# Patient Record
Sex: FEMALE | Race: WHITE | NOT HISPANIC OR LATINO | Employment: UNEMPLOYED | URBAN - METROPOLITAN AREA
[De-identification: names, ages, dates, MRNs, and addresses within clinical notes are randomized per-mention and may not be internally consistent; named-entity substitution may affect disease eponyms.]

---

## 2018-12-12 ENCOUNTER — APPOINTMENT (OUTPATIENT)
Dept: GENERAL RADIOLOGY | Facility: HOSPITAL | Age: 83
End: 2018-12-12

## 2018-12-12 ENCOUNTER — HOSPITAL ENCOUNTER (INPATIENT)
Facility: HOSPITAL | Age: 83
LOS: 8 days | Discharge: HOSPICE | End: 2018-12-20
Attending: FAMILY MEDICINE | Admitting: INTERNAL MEDICINE

## 2018-12-12 ENCOUNTER — ANESTHESIA (OUTPATIENT)
Dept: PERIOP | Facility: HOSPITAL | Age: 83
End: 2018-12-12

## 2018-12-12 ENCOUNTER — ANESTHESIA EVENT (OUTPATIENT)
Dept: PERIOP | Facility: HOSPITAL | Age: 83
End: 2018-12-12
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
## 2018-12-12 DIAGNOSIS — S72.002A CLOSED FRACTURE OF LEFT HIP, INITIAL ENCOUNTER (HCC): Primary | ICD-10-CM

## 2018-12-12 DIAGNOSIS — Z87.81 S/P LEFT HIP FRACTURE: ICD-10-CM

## 2018-12-12 DIAGNOSIS — R26.89 DECREASED MOBILITY: ICD-10-CM

## 2018-12-12 PROBLEM — Z66 DNR (DO NOT RESUSCITATE): Status: ACTIVE | Noted: 2018-12-12

## 2018-12-12 LAB
ALBUMIN SERPL-MCNC: 3 G/DL (ref 3.5–5.2)
ALBUMIN/GLOB SERPL: 0.8 G/DL
ALP SERPL-CCNC: 67 U/L (ref 39–117)
ALT SERPL W P-5'-P-CCNC: 10 U/L (ref 1–33)
ANION GAP SERPL CALCULATED.3IONS-SCNC: 12.6 MMOL/L
AST SERPL-CCNC: 17 U/L (ref 1–32)
BASOPHILS # BLD AUTO: 0.02 10*3/MM3 (ref 0–0.2)
BASOPHILS NFR BLD AUTO: 0.2 % (ref 0–1.5)
BILIRUB SERPL-MCNC: 0.6 MG/DL (ref 0.1–1.2)
BUN BLD-MCNC: 7 MG/DL (ref 8–23)
BUN/CREAT SERPL: 11.3 (ref 7–25)
CALCIUM SPEC-SCNC: 8.8 MG/DL (ref 8.2–9.6)
CHLORIDE SERPL-SCNC: 94 MMOL/L (ref 98–107)
CO2 SERPL-SCNC: 25.4 MMOL/L (ref 22–29)
CREAT BLD-MCNC: 0.62 MG/DL (ref 0.57–1)
DEPRECATED RDW RBC AUTO: 51.8 FL (ref 37–54)
EOSINOPHIL # BLD AUTO: 0.13 10*3/MM3 (ref 0–0.7)
EOSINOPHIL NFR BLD AUTO: 1.2 % (ref 0.3–6.2)
ERYTHROCYTE [DISTWIDTH] IN BLOOD BY AUTOMATED COUNT: 15.3 % (ref 11.7–13)
GFR SERPL CREATININE-BSD FRML MDRD: 90 ML/MIN/1.73
GLOBULIN UR ELPH-MCNC: 3.8 GM/DL
GLUCOSE BLD-MCNC: 124 MG/DL (ref 65–99)
HCT VFR BLD AUTO: 41.5 % (ref 35.6–45.5)
HGB BLD-MCNC: 13.5 G/DL (ref 11.9–15.5)
HOLD SPECIMEN: NORMAL
HOLD SPECIMEN: NORMAL
IMM GRANULOCYTES # BLD: 0.04 10*3/MM3 (ref 0–0.03)
IMM GRANULOCYTES NFR BLD: 0.4 % (ref 0–0.5)
LYMPHOCYTES # BLD AUTO: 1.97 10*3/MM3 (ref 0.9–4.8)
LYMPHOCYTES NFR BLD AUTO: 17.5 % (ref 19.6–45.3)
MAGNESIUM SERPL-MCNC: 2.1 MG/DL (ref 1.7–2.3)
MCH RBC QN AUTO: 30.3 PG (ref 26.9–32)
MCHC RBC AUTO-ENTMCNC: 32.5 G/DL (ref 32.4–36.3)
MCV RBC AUTO: 93.3 FL (ref 80.5–98.2)
MONOCYTES # BLD AUTO: 0.75 10*3/MM3 (ref 0.2–1.2)
MONOCYTES NFR BLD AUTO: 6.7 % (ref 5–12)
NEUTROPHILS # BLD AUTO: 8.32 10*3/MM3 (ref 1.9–8.1)
NEUTROPHILS NFR BLD AUTO: 74 % (ref 42.7–76)
PLATELET # BLD AUTO: 392 10*3/MM3 (ref 140–500)
PMV BLD AUTO: 9.2 FL (ref 6–12)
POTASSIUM BLD-SCNC: 3.8 MMOL/L (ref 3.5–5.2)
PROT SERPL-MCNC: 6.8 G/DL (ref 6–8.5)
RBC # BLD AUTO: 4.45 10*6/MM3 (ref 3.9–5.2)
SODIUM BLD-SCNC: 132 MMOL/L (ref 136–145)
WBC NRBC COR # BLD: 11.23 10*3/MM3 (ref 4.5–10.7)
WHOLE BLOOD HOLD SPECIMEN: NORMAL
WHOLE BLOOD HOLD SPECIMEN: NORMAL

## 2018-12-12 PROCEDURE — 25010000002 HYDROMORPHONE PER 4 MG: Performed by: FAMILY MEDICINE

## 2018-12-12 PROCEDURE — 93005 ELECTROCARDIOGRAM TRACING: CPT | Performed by: INTERNAL MEDICINE

## 2018-12-12 PROCEDURE — 85025 COMPLETE CBC W/AUTO DIFF WBC: CPT | Performed by: INTERNAL MEDICINE

## 2018-12-12 PROCEDURE — 73502 X-RAY EXAM HIP UNI 2-3 VIEWS: CPT

## 2018-12-12 PROCEDURE — 25010000002 ONDANSETRON PER 1 MG: Performed by: FAMILY MEDICINE

## 2018-12-12 PROCEDURE — 36415 COLL VENOUS BLD VENIPUNCTURE: CPT | Performed by: INTERNAL MEDICINE

## 2018-12-12 PROCEDURE — 83735 ASSAY OF MAGNESIUM: CPT | Performed by: INTERNAL MEDICINE

## 2018-12-12 PROCEDURE — 93010 ELECTROCARDIOGRAM REPORT: CPT | Performed by: INTERNAL MEDICINE

## 2018-12-12 PROCEDURE — 71045 X-RAY EXAM CHEST 1 VIEW: CPT

## 2018-12-12 PROCEDURE — 25010000002 HYDROMORPHONE PER 4 MG: Performed by: INTERNAL MEDICINE

## 2018-12-12 PROCEDURE — 80053 COMPREHEN METABOLIC PANEL: CPT | Performed by: INTERNAL MEDICINE

## 2018-12-12 PROCEDURE — 99285 EMERGENCY DEPT VISIT HI MDM: CPT

## 2018-12-12 RX ORDER — ACETAMINOPHEN 325 MG/1
650 TABLET ORAL EVERY 4 HOURS PRN
Status: DISCONTINUED | OUTPATIENT
Start: 2018-12-12 | End: 2018-12-20 | Stop reason: HOSPADM

## 2018-12-12 RX ORDER — SODIUM CHLORIDE 9 MG/ML
50 INJECTION, SOLUTION INTRAVENOUS CONTINUOUS
Status: DISCONTINUED | OUTPATIENT
Start: 2018-12-12 | End: 2018-12-12

## 2018-12-12 RX ORDER — FENTANYL CITRATE 50 UG/ML
INJECTION, SOLUTION INTRAMUSCULAR; INTRAVENOUS
Status: DISCONTINUED
Start: 2018-12-12 | End: 2018-12-12 | Stop reason: WASHOUT

## 2018-12-12 RX ORDER — HYDROCODONE BITARTRATE AND ACETAMINOPHEN 5; 325 MG/1; MG/1
1 TABLET ORAL EVERY 4 HOURS PRN
Status: DISCONTINUED | OUTPATIENT
Start: 2018-12-12 | End: 2018-12-20 | Stop reason: HOSPADM

## 2018-12-12 RX ORDER — ONDANSETRON 2 MG/ML
4 INJECTION INTRAMUSCULAR; INTRAVENOUS ONCE
Status: COMPLETED | OUTPATIENT
Start: 2018-12-12 | End: 2018-12-12

## 2018-12-12 RX ORDER — ONDANSETRON 4 MG/1
4 TABLET, ORALLY DISINTEGRATING ORAL EVERY 6 HOURS PRN
Status: DISCONTINUED | OUTPATIENT
Start: 2018-12-12 | End: 2018-12-20 | Stop reason: HOSPADM

## 2018-12-12 RX ORDER — HYDROMORPHONE HYDROCHLORIDE 1 MG/ML
0.5 INJECTION, SOLUTION INTRAMUSCULAR; INTRAVENOUS; SUBCUTANEOUS
Status: DISCONTINUED | OUTPATIENT
Start: 2018-12-12 | End: 2018-12-20 | Stop reason: HOSPADM

## 2018-12-12 RX ORDER — ONDANSETRON 2 MG/ML
4 INJECTION INTRAMUSCULAR; INTRAVENOUS EVERY 6 HOURS PRN
Status: DISCONTINUED | OUTPATIENT
Start: 2018-12-12 | End: 2018-12-20 | Stop reason: HOSPADM

## 2018-12-12 RX ORDER — DOCUSATE SODIUM 100 MG/1
100 CAPSULE, LIQUID FILLED ORAL 2 TIMES DAILY
Status: DISCONTINUED | OUTPATIENT
Start: 2018-12-12 | End: 2018-12-15

## 2018-12-12 RX ORDER — CALCIUM CARBONATE 200(500)MG
2 TABLET,CHEWABLE ORAL 2 TIMES DAILY PRN
Status: DISCONTINUED | OUTPATIENT
Start: 2018-12-12 | End: 2018-12-20 | Stop reason: HOSPADM

## 2018-12-12 RX ORDER — SODIUM CHLORIDE 0.9 % (FLUSH) 0.9 %
3 SYRINGE (ML) INJECTION EVERY 12 HOURS SCHEDULED
Status: DISCONTINUED | OUTPATIENT
Start: 2018-12-12 | End: 2018-12-20 | Stop reason: HOSPADM

## 2018-12-12 RX ORDER — SODIUM CHLORIDE 0.9 % (FLUSH) 0.9 %
3-10 SYRINGE (ML) INJECTION AS NEEDED
Status: DISCONTINUED | OUTPATIENT
Start: 2018-12-12 | End: 2018-12-20 | Stop reason: HOSPADM

## 2018-12-12 RX ORDER — SODIUM CHLORIDE 9 MG/ML
125 INJECTION, SOLUTION INTRAVENOUS CONTINUOUS
Status: DISCONTINUED | OUTPATIENT
Start: 2018-12-12 | End: 2018-12-12

## 2018-12-12 RX ORDER — HYDROMORPHONE HYDROCHLORIDE 1 MG/ML
0.5 INJECTION, SOLUTION INTRAMUSCULAR; INTRAVENOUS; SUBCUTANEOUS ONCE
Status: COMPLETED | OUTPATIENT
Start: 2018-12-12 | End: 2018-12-12

## 2018-12-12 RX ORDER — NALOXONE HCL 0.4 MG/ML
0.4 VIAL (ML) INJECTION
Status: DISCONTINUED | OUTPATIENT
Start: 2018-12-12 | End: 2018-12-20 | Stop reason: HOSPADM

## 2018-12-12 RX ORDER — SODIUM CHLORIDE 9 MG/ML
50 INJECTION, SOLUTION INTRAVENOUS CONTINUOUS
Status: DISCONTINUED | OUTPATIENT
Start: 2018-12-12 | End: 2018-12-13

## 2018-12-12 RX ORDER — ONDANSETRON 4 MG/1
4 TABLET, FILM COATED ORAL EVERY 6 HOURS PRN
Status: DISCONTINUED | OUTPATIENT
Start: 2018-12-12 | End: 2018-12-20 | Stop reason: HOSPADM

## 2018-12-12 RX ADMIN — SODIUM CHLORIDE, PRESERVATIVE FREE 3 ML: 5 INJECTION INTRAVENOUS at 20:36

## 2018-12-12 RX ADMIN — HYDROMORPHONE HYDROCHLORIDE 0.5 MG: 1 INJECTION, SOLUTION INTRAMUSCULAR; INTRAVENOUS; SUBCUTANEOUS at 11:46

## 2018-12-12 RX ADMIN — ONDANSETRON 4 MG: 2 INJECTION INTRAMUSCULAR; INTRAVENOUS at 11:45

## 2018-12-12 RX ADMIN — HYDROCODONE BITARTRATE AND ACETAMINOPHEN 1 TABLET: 5; 325 TABLET ORAL at 22:17

## 2018-12-12 RX ADMIN — DOCUSATE SODIUM 100 MG: 100 CAPSULE, LIQUID FILLED ORAL at 20:36

## 2018-12-12 RX ADMIN — SODIUM CHLORIDE 50 ML/HR: 9 INJECTION, SOLUTION INTRAVENOUS at 18:33

## 2018-12-12 RX ADMIN — HYDROCODONE BITARTRATE AND ACETAMINOPHEN 1 TABLET: 5; 325 TABLET ORAL at 18:32

## 2018-12-12 RX ADMIN — SODIUM CHLORIDE 250 ML: 9 INJECTION, SOLUTION INTRAVENOUS at 13:26

## 2018-12-12 RX ADMIN — HYDROMORPHONE HYDROCHLORIDE 0.5 MG: 1 INJECTION, SOLUTION INTRAMUSCULAR; INTRAVENOUS; SUBCUTANEOUS at 15:26

## 2018-12-12 NOTE — H&P
Patient Name:  Araceli Mitchell  YOB: 1926  MRN:  [id number]  Admit Date:  12/12/2018  Patient Care Team:  Provider, No Known as PCP - General  Provider, No Known as PCP - Family Medicine  Elisabet Guerrero as PCP - Claims Attributed      Chief Complaint   Patient presents with   • Hip Injury     left, shortened and rotated     Subjective   Ms. Mitchell is a 89 y.o. former smoker with a history of CAD with ICM as well as afib intolerant of anticoagulation on DAPT that presents to [facility] complaining of left hip pain following a mechanical fall. She was found to have a left hip fracture.    She has a history of ICM with last known LVEF of 25% in September 2016.  At the time she had expressed that she did not want further workup including cardiac catheterization and elected for medical management.  She denies having followed up with a cardiologist since.  She denies chest pain, dyspnea on exertion, orthopnea, and peripheral edema. She walks from room to room in her house but does not do much else.       History of Present Illness    Past Medical History:   Diagnosis Date   • CHF (congestive heart failure) (CMS/HCC)    • Hyperlipidemia    • Hypertension      Past Surgical History:   Procedure Laterality Date   • CARDIAC SURGERY      stent placement     Family History   Problem Relation Age of Onset   • Heart attack Mother    • Heart attack Father      Social History     Tobacco Use   • Smoking status: Former Smoker   • Smokeless tobacco: Never Used   • Tobacco comment: 50 yrs as of 2016   Substance Use Topics   • Alcohol use: No   • Drug use: No     Medications Prior to Admission   Medication Sig Dispense Refill Last Dose   • aspirin 325 MG tablet Take 1 tablet by mouth daily. (Patient taking differently: Take 81 mg by mouth Daily.)   Patient Taking Differently at Unknown time   • furosemide (LASIX) 40 MG tablet Take 0.5 tablets by mouth every morning.   12/11/2018 at Unknown time   •  metoprolol succinate XL (TOPROL-XL) 25 MG 24 hr tablet Take 1 tablet by mouth daily.   12/11/2018 at Unknown time   • acetaminophen (TYLENOL) 325 MG tablet Take 2 tablets by mouth every 6 (six) hours as needed for mild pain (1-3).  0 More than a month at Unknown time   • clopidogrel (PLAVIX) 75 MG tablet Take 1 tablet by mouth daily. 30 tablet 0 More than a month at Unknown time   • lansoprazole (PREVACID) 30 MG capsule Take 30 mg by mouth 2 (two) times a day.   More than a month at Unknown time   • melatonin 3 MG tablet Take 1 tablet by mouth at night as needed for sleep.   More than a month at Unknown time   • pravastatin (PRAVACHOL) 80 MG tablet Take 1 tablet by mouth every night. 30 tablet 0 More than a month at Unknown time   • ranolazine (RANEXA) 1000 MG 12 hr tablet Take 1 tablet by mouth every 12 (twelve) hours.   Unknown at Unknown time   • rOPINIRole (REQUIP) 0.25 MG tablet Take 3 tablets by mouth every night. Take 1 hour before bedtime.   Unknown at Unknown time     Allergies:    Allergies   Allergen Reactions   • Clopidogrel Bisulfate Other (See Comments)     dizzy   • Lisinopril Other (See Comments)     Cough       Review of Systems   Constitutional: Negative for chills, fatigue and fever.   HENT: Negative for congestion, nosebleeds, sore throat and trouble swallowing.    Eyes: Negative for redness and visual disturbance.   Respiratory: Negative for cough and choking.    Cardiovascular: Negative for chest pain, palpitations and leg swelling.   Gastrointestinal: Negative for abdominal pain, blood in stool, constipation, diarrhea, nausea and vomiting.   Endocrine: Negative for cold intolerance and heat intolerance.   Genitourinary: Negative for difficulty urinating, dysuria and hematuria.   Musculoskeletal: Negative for arthralgias and myalgias.   Skin: Negative for pallor and rash.   Neurological: Negative for dizziness, light-headedness and headaches.   Hematological: Negative for adenopathy. Does not  bruise/bleed easily.   Psychiatric/Behavioral: Negative for confusion and decreased concentration.        Objective    Vital Signs  Temp:  [97 °F (36.1 °C)-97.3 °F (36.3 °C)] 97 °F (36.1 °C)  Heart Rate:  [68-72] 72  Resp:  [15-16] 16  BP: (112-154)/(67-82) 136/67  SpO2:  [94 %-98 %] 95 %  on   ;   Device (Oxygen Therapy): room air  Body mass index is 17.57 kg/m².    Physical Exam   Constitutional: She is oriented to person, place, and time. No distress.   HENT:   Head: Normocephalic and atraumatic.   Mouth/Throat: Oropharynx is clear and moist.   Eyes: Conjunctivae and EOM are normal. Pupils are equal, round, and reactive to light.   Neck: Normal range of motion. Neck supple. No JVD present.   Cardiovascular: Normal rate, regular rhythm and intact distal pulses.   Pulmonary/Chest: Effort normal and breath sounds normal. She has no rales.   Abdominal: Soft. Bowel sounds are normal.   Musculoskeletal: She exhibits no edema or tenderness.   Neurological: She is alert and oriented to person, place, and time.   Skin: Skin is warm and dry. She is not diaphoretic.   Psychiatric: She has a normal mood and affect. Her behavior is normal.   Nursing note and vitals reviewed.      Results Review:  I reviewed the patient's new clinical results.  I reviewed the patient's new imaging results and agree with the interpretation.  I reviewed the patient's other test results and agree with the interpretation  Discussed with ED provider.      Lab Results (last 24 hours)     Procedure Component Value Units Date/Time    CBC & Differential [[id number]] Collected:  12/12/18 1113    Specimen:  Blood Updated:  12/12/18 1446    Narrative:       The following orders were created for panel order CBC & Differential.  Procedure                               Abnormality         Status                     ---------                               -----------         ------                     CBC Auto Differential[[id number]]        Abnormal             Final result                 Please view results for these tests on the individual orders.    CBC Auto Differential [[id number]]  (Abnormal) Collected:  12/12/18 1113    Specimen:  Blood Updated:  12/12/18 1446     WBC 11.23 10*3/mm3      RBC 4.45 10*6/mm3      Hemoglobin 13.5 g/dL      Hematocrit 41.5 %      MCV 93.3 fL      MCH 30.3 pg      MCHC 32.5 g/dL      RDW 15.3 %      RDW-SD 51.8 fl      MPV 9.2 fL      Platelets 392 10*3/mm3      Neutrophil % 74.0 %      Lymphocyte % 17.5 %      Monocyte % 6.7 %      Eosinophil % 1.2 %      Basophil % 0.2 %      Immature Grans % 0.4 %      Neutrophils, Absolute 8.32 10*3/mm3      Lymphocytes, Absolute 1.97 10*3/mm3      Monocytes, Absolute 0.75 10*3/mm3      Eosinophils, Absolute 0.13 10*3/mm3      Basophils, Absolute 0.02 10*3/mm3      Immature Grans, Absolute 0.04 10*3/mm3           Imaging Results (last 24 hours)     Procedure Component Value Units Date/Time    XR Hip With or Without Pelvis 2 - 3 View Left [[id number]] Collected:  12/12/18 1311     Updated:  12/12/18 1333    Narrative:       PELVIS/LEFT HIP, AP CHEST     HISTORY: 89-year-old female with multiple falls this morning. Left hip  pain. History of hypertension.     COMPARISON: PA and lateral chest 09/02/2016, AP chest 08/30/2016.     FINDINGS:  AP PELVIS AND AP AND FROG LATERAL LEFT HIP: There is an acute left  intertrochanteric fracture that is associated with impaction and varus  angulation. There is potential extension to the basocervical medial left  femoral neck. The bones are osteopenic.     AP SUPINE CHEST: Large hiatal or paraesophageal hernia is present with  extension of colon and stomach overlapping the left lower thorax. Hiatal  hernia is increased in size compared to the previous exam. This obscures  evaluation of the left lung base and the heart. Heart size appears  enlarged. There is chronic interstitial disease. Dense atherosclerotic  calcifications are present. There is a scoliotic curvature of  the  thoracolumbar spine and multiple compression deformities are present  throughout the thoracic and lumbar spine. Diffuse osteopenia is present.       Impression:       1. Left intertrochanteric fracture with impaction and varus angulation  and potential extension to the basicervical femoral neck.  2. Advanced osteopenia. Multiple compression deformities in the  thoracolumbar spine.  3. Large hiatal or paraesophageal hernia with extension of stomach and  colonic loops overlapping the left lower thorax obscuring evaluation of  the left lung base. There is no convincing evidence for active disease  in the chest.     This report was finalized on 12/12/2018 1:30 PM by Dr. Sheng Hubbard M.D.       XR Chest 1 View [[id number]] Collected:  12/12/18 1311     Updated:  12/12/18 1333    Narrative:       PELVIS/LEFT HIP, AP CHEST     HISTORY: 89-year-old female with multiple falls this morning. Left hip  pain. History of hypertension.     COMPARISON: PA and lateral chest 09/02/2016, AP chest 08/30/2016.     FINDINGS:  AP PELVIS AND AP AND FROG LATERAL LEFT HIP: There is an acute left  intertrochanteric fracture that is associated with impaction and varus  angulation. There is potential extension to the basocervical medial left  femoral neck. The bones are osteopenic.     AP SUPINE CHEST: Large hiatal or paraesophageal hernia is present with  extension of colon and stomach overlapping the left lower thorax. Hiatal  hernia is increased in size compared to the previous exam. This obscures  evaluation of the left lung base and the heart. Heart size appears  enlarged. There is chronic interstitial disease. Dense atherosclerotic  calcifications are present. There is a scoliotic curvature of the  thoracolumbar spine and multiple compression deformities are present  throughout the thoracic and lumbar spine. Diffuse osteopenia is present.       Impression:       1. Left intertrochanteric fracture with impaction and varus  angulation  and potential extension to the basicervical femoral neck.  2. Advanced osteopenia. Multiple compression deformities in the  thoracolumbar spine.  3. Large hiatal or paraesophageal hernia with extension of stomach and  colonic loops overlapping the left lower thorax obscuring evaluation of  the left lung base. There is no convincing evidence for active disease  in the chest.     This report was finalized on 12/12/2018 1:30 PM by Dr. Sheng Hubbard M.D.             ECG 12 Lead    (Results Pending)     Assessment/Plan   Active Hospital Problems    Diagnosis Date Noted   • **Closed left hip fracture, initial encounter (CMS/[facility]) [S72.002A] 12/12/2018   • Closed fracture of left hip (CMS/[facility]) [S72.002A] 12/12/2018   • DNR (do not resuscitate) [Z66] 12/12/2018   • Chronic a-fib (CMS/[facility]) [I48.2] 09/04/2016   • Ischemic cardiomyopathy [I25.5] 09/04/2016   • CAD (coronary artery disease) [I25.10] 09/04/2016      Resolved Hospital Problems   No resolved problems to display.   Left Hip fracture  - from mechanical fall  - place on bed rest, pain control  - orthopedic surgery consulted, planning OR today  - she is at elevated operative cardiac risk given her CAD and CHF (currently asymptomatic)-RCRI is 2, representing 6.6% chance of major cardiac event which realistically is probably a little higher than that- I did discuss this with the patient as well as the risk of not having/delaying surgery for her hip.  She states that she would not want invasive cardiac intervention such as catheterization, and review of records shows she did elect to forego this when she was hospitalized here 2 years ago.  She understands these risks including fatal cardiac arrest and wishes to proceed.  I will check an ecg to ensure no active issues prior to surgery.     CAD/ICM  - last known LVEF 25% in 9/2016  - currently no cardiac symptoms and no signs of CHF  - will monitor closely on telemetry following surgery    DVT Prophylaxis  -  SCDs    Code Status  - I discussed with the patient and her son and she is DNR.    D/w Dr. Alonso    I discussed the patients findings and my recommendations with patient, family and nursing staff.      John Quiroz MD  [facility]  12/12/18  4:36 PM

## 2018-12-12 NOTE — CONSULTS
ORTHOPAEDIC SURGERY CONSULT NOTE  HPI:  Patient is a 89 y.o. Not  or  female who presents with hip pain after a fall from standing.  They presented to the ER for further workup where a left Intertrochanteric Hip Fracture was found. I was consulted for further management.  She does have an extensive cardiac history.      Past Medical History:   Diagnosis Date   • CHF (congestive heart failure) (CMS/[facility])    • Hyperlipidemia    • Hypertension      Past Surgical History:   Procedure Laterality Date   • CARDIAC SURGERY      stent placement     Prior to Admission medications    Medication Sig Start Date End Date Taking? Authorizing Provider   aspirin 325 MG tablet Take 1 tablet by mouth daily.  Patient taking differently: Take 81 mg by mouth Daily. 9/10/16  Yes Marietta Carrasco MD   furosemide (LASIX) 40 MG tablet Take 0.5 tablets by mouth every morning. 9/10/16  Yes Marietta Carrasco MD   metoprolol succinate XL (TOPROL-XL) 25 MG 24 hr tablet Take 1 tablet by mouth daily. 9/10/16  Yes Marietta Carrasco MD   acetaminophen (TYLENOL) 325 MG tablet Take 2 tablets by mouth every 6 (six) hours as needed for mild pain (1-3). 9/10/16   Marietta Carrasco MD   clopidogrel (PLAVIX) 75 MG tablet Take 1 tablet by mouth daily. 9/10/16   Marietta Carrasco MD   lansoprazole (PREVACID) 30 MG capsule Take 30 mg by mouth 2 (two) times a day. 9/11/15   Rc Moralez MD   melatonin 3 MG tablet Take 1 tablet by mouth at night as needed for sleep. 9/10/16   Marietta Carrasco MD   pravastatin (PRAVACHOL) 80 MG tablet Take 1 tablet by mouth every night. 9/10/16   Marietta Carrasco MD   ranolazine (RANEXA) 1000 MG 12 hr tablet Take 1 tablet by mouth every 12 (twelve) hours. 9/10/16   Marietta Carrasco MD   rOPINIRole (REQUIP) 0.25 MG tablet Take 3 tablets by mouth every night. Take 1 hour before bedtime. 9/10/16   Marietta Carrasco MD     Allergies   Allergen Reactions   • Clopidogrel Bisulfate Other (See Comments)     dizzy   •  "Lisinopril Other (See Comments)     Cough       There is no immunization history on file for this patient.  Social History     Tobacco Use   • Smoking status: Former Smoker   • Smokeless tobacco: Never Used   • Tobacco comment: 50 yrs as of 2016   Substance Use Topics   • Alcohol use: No      Social History     Substance and Sexual Activity   Drug Use No     REVIEW OF SYSTEMS:  Head: negative for headache  Respiratory: negative for shortness of breath.   Cardiovascular: negative for chest pain.   Gastrointestinal: negative abdominal pain.   Neurological: negative for LOC  Psychiatric/Behavioral: negative for memory loss.   All other systems reviewed and are negative  VITALS: /67 (BP Location: Left arm, Patient Position: Lying)   Pulse 72   Temp 97 °F (36.1 °C) (Oral)   Resp 16   Ht 152.4 cm (60\")   Wt 40.8 kg (89 lb 15.2 oz)   SpO2 95%   BMI 17.57 kg/m²  Body mass index is 17.57 kg/m².  EXAM:   CONSTITUTIONAL: A&Ox3, No acute distress  LUNGS: Equal chest rise, no shortness of air  CARDIOVASCULAR: palpable peripheral pulses  SKIN: no skin lesions in the area examined  LYMPH: no lymphadenopathy in the area examined  EXTREMITY: Left Lower Extremity   Tenderness to Palpation: Tenderness to palpation at the hip   Pulses:  Palpable DP/PT pulses   Sensation: Sensation intact to light touch to saphenous/sural/deep peroneal/superficial peroneal/tibial nerves   Motor: 5 out of 5 EHL/FHL/TA/GS motor complexes   Range of Motion: Range of motion of hip deferred secondary to pain.  Positive pain with passive leg roll    DATA REVIEW:   Xr Chest 1 View    Result Date: 12/12/2018  1. Left intertrochanteric fracture with impaction and varus angulation and potential extension to the basicervical femoral neck. 2. Advanced osteopenia. Multiple compression deformities in the thoracolumbar spine. 3. Large hiatal or paraesophageal hernia with extension of stomach and colonic loops overlapping the left lower thorax obscuring " evaluation of the left lung base. There is no convincing evidence for active disease in the chest.  This report was finalized on 12/12/2018 1:30 PM by Dr. Sheng Hubbard M.D.      Xr Hip With Or Without Pelvis 2 - 3 View Left    Result Date: 12/12/2018  1. Left intertrochanteric fracture with impaction and varus angulation and potential extension to the basicervical femoral neck. 2. Advanced osteopenia. Multiple compression deformities in the thoracolumbar spine. 3. Large hiatal or paraesophageal hernia with extension of stomach and colonic loops overlapping the left lower thorax obscuring evaluation of the left lung base. There is no convincing evidence for active disease in the chest.  This report was finalized on 12/12/2018 1:30 PM by Dr. Sheng Hubbard M.D.      Labs:   Results for the past 24 hours:   Recent Results (from the past 24 hour(s))   Light Blue Top    Collection Time: 12/12/18 11:13 AM   Result Value Ref Range    Extra Tube hold for add-on    Green Top (Gel)    Collection Time: 12/12/18 11:13 AM   Result Value Ref Range    Extra Tube Hold for add-ons.    Lavender Top    Collection Time: 12/12/18 11:13 AM   Result Value Ref Range    Extra Tube hold for add-on    Gold Top - SST    Collection Time: 12/12/18 11:13 AM   Result Value Ref Range    Extra Tube Hold for add-ons.    CBC Auto Differential    Collection Time: 12/12/18 11:13 AM   Result Value Ref Range    WBC 11.23 (H) 4.50 - 10.70 10*3/mm3    RBC 4.45 3.90 - 5.20 10*6/mm3    Hemoglobin 13.5 11.9 - 15.5 g/dL    Hematocrit 41.5 35.6 - 45.5 %    MCV 93.3 80.5 - 98.2 fL    MCH 30.3 26.9 - 32.0 pg    MCHC 32.5 32.4 - 36.3 g/dL    RDW 15.3 (H) 11.7 - 13.0 %    RDW-SD 51.8 37.0 - 54.0 fl    MPV 9.2 6.0 - 12.0 fL    Platelets 392 140 - 500 10*3/mm3    Neutrophil % 74.0 42.7 - 76.0 %    Lymphocyte % 17.5 (L) 19.6 - 45.3 %    Monocyte % 6.7 5.0 - 12.0 %    Eosinophil % 1.2 0.3 - 6.2 %    Basophil % 0.2 0.0 - 1.5 %    Immature Grans % 0.4 0.0 - 0.5 %     Neutrophils, Absolute 8.32 (H) 1.90 - 8.10 10*3/mm3    Lymphocytes, Absolute 1.97 0.90 - 4.80 10*3/mm3    Monocytes, Absolute 0.75 0.20 - 1.20 10*3/mm3    Eosinophils, Absolute 0.13 0.00 - 0.70 10*3/mm3    Basophils, Absolute 0.02 0.00 - 0.20 10*3/mm3    Immature Grans, Absolute 0.04 (H) 0.00 - 0.03 10*3/mm3   Comprehensive Metabolic Panel    Collection Time: 12/12/18  4:30 PM   Result Value Ref Range    Glucose 124 (H) 65 - 99 mg/dL    BUN 7 (L) 8 - 23 mg/dL    Creatinine 0.62 0.57 - 1.00 mg/dL    Sodium 132 (L) 136 - 145 mmol/L    Potassium 3.8 3.5 - 5.2 mmol/L    Chloride 94 (L) 98 - 107 mmol/L    CO2 25.4 22.0 - 29.0 mmol/L    Calcium 8.8 8.2 - 9.6 mg/dL    Total Protein 6.8 6.0 - 8.5 g/dL    Albumin 3.00 (L) 3.50 - 5.20 g/dL    ALT (SGPT) 10 1 - 33 U/L    AST (SGOT) 17 1 - 32 U/L    Alkaline Phosphatase 67 39 - 117 U/L    Total Bilirubin 0.6 0.1 - 1.2 mg/dL    eGFR Non African Amer 90 >60 mL/min/1.73    Globulin 3.8 gm/dL    A/G Ratio 0.8 g/dL    BUN/Creatinine Ratio 11.3 7.0 - 25.0    Anion Gap 12.6 mmol/L             IMPRESSION:  Patient is a 89 y.o. Not  or  female with left Intertrochanteric Hip Fracture  PLAN:   - Admited to: John Quiroz MD  - Diet: NPO after midnight, Regular for Now  - Weight Bearing:Left Lower Extremity Non Weight Bearing  - Labs: None additional needed  - Imaging: None additional needed  - Surgery: Intramedullary nailing for intertrochanteric hip fracture  - Consent: The risks and benefits of operative versus nonoperative treatment were discussed.  The patient elected to undergo operative treatment of their injury.  The risks discussed included but were not limited to blood clots, MI, stroke, other medical complications, infection, damage to neurovascular structures,, malunion, nonunion,, hardware prominence,, loss of range of motion, stiffness,, infection, need for further procedures, and and risk of anesthesia..  No guarantees were made   - RIVER castillo: I had a  discussion today with the hospitalist about her A. fib.  She has an extensive cardiac history, but she wishes to avoid any invasive procedures.  He wanted to check a EKG before proceeding with surgery.  The EKG showed no new cardiac disease, and so he cleared her for surgery this evening.  She is obviously at high risk because of her heart disease.  - Disposition: I came to the hospital tonight with the intention of performing the surgery for this patient.  She was nothing by mouth, and cleared by the medicine team prior to the 5:30 scheduled start time.  However, when I showed up in preop before her scheduled surgery time, she was not there.  When I discussed with the  Y the patient was in the preoperative area, they told me that the surgery had been delayed in might not be for an hour, 2 hours, or maybe even more.  They couldn't give me an explicit time when surgery could proceed tonight.  I don't feel it is fair to the patient to make them wait an unknown amount of time and then proceed with surgery late tonight with the surgical team that would necessarily be familiar with orthopedic equipment.  I will therefore delay her surgery until tomorrow.  I was told that there is an opening at 1 PM tomorrow afternoon to perform the surgery.  I will clear my schedule tomorrow for a 1 PM surgery time.  However, if it is further delayed end of the late afternoon, I will have to perform the surgery on Friday.  I made the patient aware that surgery is canceled tonight because of the OR and not because of the surgical team.  The patient understands unfortunately she cannot be given prompt care due to OR delays.  Hopefully I will be old to take care of this patient tomorrow.    Suhas Alonso II, MD  Orthopaedic Surgery  [facility]

## 2018-12-12 NOTE — ED TRIAGE NOTES
Patient presents to ER via ems from home after a mechanical fall.  Patient LLE is shortened and rotated.

## 2018-12-12 NOTE — ED PROVIDER NOTES
EMERGENCY DEPARTMENT ENCOUNTER    CHIEF COMPLAINT  Chief Complaint: left hip pain  History given by: patient  History limited by: nothing  Room Number: 12/12  PMD: Provider, No Known      HPI:  Pt is a 89 y.o. female who presents complaining of left hip pain that began PTA s/p a mechanical fall while at home. Pt reports that she lost her balance, fell, and landed on her left hip. Pt denies hitting her head, LOC, or any other sustaining injuries. Pt has a hx of CHF, hypertension, and hyperlipidemia. Pt currently takes 75 mg of Plavix and a 325 mg ASA daily. There are no other complaints at this time.    Duration: PTA  Onset: sudden  Timing: constant  Location: left hip  Radiation: none specified  Quality: pain  Intensity/Severity: moderate  Progression: not specified  Associated Symptoms: none specified  Aggravating Factors: mechanical fall  Alleviating Factors: none specified  Previous Episodes: none specified  Treatment before arrival: none specifid    PAST MEDICAL HISTORY  Active Ambulatory Problems     Diagnosis Date Noted   • NSTEMI (non-ST elevated myocardial infarction) (CMS/[facility]) 09/04/2016   • CAD (coronary artery disease) 09/04/2016   • Ischemic cardiomyopathy 09/04/2016   • Metabolic encephalopathy 09/04/2016   • Chronic a-fib (CMS/[facility]) 09/04/2016   • Hyponatremia 09/04/2016   • Hyperlipidemia 09/04/2016   • Pulmonary HTN (CMS/[facility]) 09/04/2016   • GERD (gastroesophageal reflux disease) 09/04/2016   • RLS (restless legs syndrome) 09/04/2016   • Pulmonary fibrosis (CMS/[facility]) 09/04/2016     Resolved Ambulatory Problems     Diagnosis Date Noted   • No Resolved Ambulatory Problems     Past Medical History:   Diagnosis Date   • CHF (congestive heart failure) (CMS/[facility])    • Hyperlipidemia    • Hypertension        PAST SURGICAL HISTORY  Past Surgical History:   Procedure Laterality Date   • CARDIAC SURGERY      stent placement       FAMILY HISTORY  Family History   Problem Relation Age of Onset   • Heart attack  Mother    • Heart attack Father        SOCIAL HISTORY  Social History     Socioeconomic History   • Marital status: Single     Spouse name: Not on file   • Number of children: Not on file   • Years of education: Not on file   • Highest education level: Not on file   Social Needs   • Financial resource strain: Not on file   • Food insecurity - worry: Not on file   • Food insecurity - inability: Not on file   • Transportation needs - medical: Not on file   • Transportation needs - non-medical: Not on file   Occupational History   • Not on file   Tobacco Use   • Smoking status: Former Smoker   • Smokeless tobacco: Never Used   • Tobacco comment: 50 yrs as of 2016   Substance and Sexual Activity   • Alcohol use: No   • Drug use: No   • Sexual activity: Defer   Other Topics Concern   • Not on file   Social History Narrative   • Not on file       ALLERGIES  Clopidogrel bisulfate and Lisinopril    REVIEW OF SYSTEMS  Review of Systems   Constitutional: Negative for fever.   HENT: Negative for sore throat.    Eyes: Negative.    Respiratory: Negative for cough and shortness of breath.    Cardiovascular: Negative for chest pain.   Gastrointestinal: Negative for abdominal pain, diarrhea and vomiting.   Genitourinary: Negative for dysuria.   Musculoskeletal: Positive for arthralgias (left hip). Negative for neck pain.   Skin: Negative for rash.   Allergic/Immunologic: Negative.    Neurological: Negative for syncope, weakness, numbness and headaches.   Hematological: Negative.    Psychiatric/Behavioral: Negative.    All other systems reviewed and are negative.      PHYSICAL EXAM  ED Triage Vitals [12/12/18 1030]   Temp Heart Rate Resp BP SpO2   97.3 °F (36.3 °C) 68 15 112/82 98 %      Temp src Heart Rate Source Patient Position BP Location FiO2 (%)   -- -- -- -- --       Physical Exam   Constitutional: She is oriented to person, place, and time. No distress.   HENT:   Head: Normocephalic and atraumatic.   Eyes: EOM are normal.  Pupils are equal, round, and reactive to light.   Neck: Normal range of motion. Neck supple.   Cardiovascular: Normal rate, regular rhythm and normal heart sounds. Exam reveals no gallop and no friction rub.   No murmur heard.  Pulmonary/Chest: Effort normal and breath sounds normal. No respiratory distress. She has no decreased breath sounds. She has no wheezes. She has no rhonchi. She has no rales. She exhibits no tenderness.   Abdominal: Soft. There is no tenderness. There is no rebound and no guarding.   Musculoskeletal: Normal range of motion. She exhibits no edema.   The left leg is shortened and externally rotated. There is increased pain of the left hip with movement of the left leg.   Neurological: She is alert and oriented to person, place, and time. She has normal sensation and normal strength.   Skin: Skin is warm and dry. No rash noted.   Psychiatric: Mood and affect normal.   Nursing note and vitals reviewed.      RADIOLOGY  XR Hip With or Without Pelvis 2 - 3 View Left   Final Result   1. Left intertrochanteric fracture with impaction and varus angulation   and potential extension to the basicervical femoral neck.   2. Advanced osteopenia. Multiple compression deformities in the   thoracolumbar spine.   3. Large hiatal or paraesophageal hernia with extension of stomach and   colonic loops overlapping the left lower thorax obscuring evaluation of   the left lung base. There is no convincing evidence for active disease   in the chest.       This report was finalized on 12/12/2018 1:30 PM by Dr. Sheng Hubbard M.D.          XR Chest 1 View   Final Result   1. Left intertrochanteric fracture with impaction and varus angulation   and potential extension to the basicervical femoral neck.   2. Advanced osteopenia. Multiple compression deformities in the   thoracolumbar spine.   3. Large hiatal or paraesophageal hernia with extension of stomach and   colonic loops overlapping the left lower thorax  obscuring evaluation of   the left lung base. There is no convincing evidence for active disease   in the chest.       This report was finalized on 12/12/2018 1:30 PM by Dr. Sheng Hubbard M.D.          XR Hip With or Without Pelvis 2 - 3 View Right    (Results Pending)   FL C Arm During Surgery    (Results Pending)        I ordered the above noted radiological studies. Interpreted by radiologist. Reviewed by me in PACS.       PROCEDURES  Procedures      PROGRESS AND CONSULTS  1124 Ordered L Hip XR and CXR for further evaluation. Also ordered Dilaudid for pain and Zofran for nausea.    1248 Rechecked the patient who is resting comfortably and in NAD. Her vital signs are stable. Informed the patient that her L Hip XR shows a left hip fracture. Discussed the plan for admission for further evaluation and management. Pt understands and agrees with the plan, all questions answered.    1221 Placed call to [facility] for admission and Ortho for consult.    1309 Received a call from Dr. Mo Alonso, ortho, and discussed pt's case. Dr. Alonso agreed with plan to consult.    1315 Received a call from Dr. Rupert Quiroz, [facility], and discussed pt's case. Dr. Quiroz agreed with plan to admit the patient to med/surg for further evaluation and management.    1322 Ordered IV Fluids for hydration.      MEDICAL DECISION MAKING  Results were reviewed/discussed with the patient and they were also made aware of online access. Pt also made aware that some labs, such as cultures, will not be resulted during ER visit and follow up with PMD is necessary.     MDM  Number of Diagnoses or Management Options  S/p left hip fracture:      Amount and/or Complexity of Data Reviewed  Tests in the radiology section of CPT®: ordered and reviewed (CXR shows large hiatal or paraesophageal hernia with extension of stomach and colonic loops overlapping the left lower thorax obscuring evaluation of the left lung base. There is no convincing evidence for active  disease in the chest. L Hip shows shows left intertrochanteric fracture with impaction and varus angulation and potential extension to the basicervical femoral neck.)  Decide to obtain previous medical records or to obtain history from someone other than the patient: yes (Epic)  Review and summarize past medical records: yes (The patient has no recent ED visits in [facility].)  Discuss the patient with other providers: yes (Dr. Mo Alonso, ortho, and Dr. Rupert Quiroz, [facility])  Independent visualization of images, tracings, or specimens: yes    Patient Progress  Patient progress: stable         DIAGNOSIS  Final diagnoses:   S/p left hip fracture   Fall, initial encounter       DISPOSITION  ADMISSION TO MED/SURG    Discussed treatment plan and reason for admission with pt/family and admitting physician.  Pt/family voiced understanding of the plan for admission for further testing/treatment as needed.     Latest Documented Vital Signs:  As of 2:49 PM  BP- 145/76 HR- 68 Temp- 97.3 °F (36.3 °C) O2 sat- 94%    --  Documentation assistance provided by celso Lazar for Dr. Gin MD.  Information recorded by the scribe was done at my direction and has been verified and validated by me.       Bety Lazar  12/12/18 8635       Telly Greenfield MD  12/12/18 4251

## 2018-12-13 ENCOUNTER — APPOINTMENT (OUTPATIENT)
Dept: GENERAL RADIOLOGY | Facility: HOSPITAL | Age: 83
End: 2018-12-13

## 2018-12-13 PROBLEM — I50.22 CHRONIC SYSTOLIC CHF (CONGESTIVE HEART FAILURE) (HCC): Status: ACTIVE | Noted: 2018-12-13

## 2018-12-13 LAB
ANION GAP SERPL CALCULATED.3IONS-SCNC: 11.4 MMOL/L
BASOPHILS # BLD AUTO: 0.01 10*3/MM3 (ref 0–0.2)
BASOPHILS NFR BLD AUTO: 0.1 % (ref 0–1.5)
BUN BLD-MCNC: 8 MG/DL (ref 8–23)
BUN/CREAT SERPL: 15.4 (ref 7–25)
CALCIUM SPEC-SCNC: 8.3 MG/DL (ref 8.2–9.6)
CHLORIDE SERPL-SCNC: 97 MMOL/L (ref 98–107)
CO2 SERPL-SCNC: 25.6 MMOL/L (ref 22–29)
CREAT BLD-MCNC: 0.52 MG/DL (ref 0.57–1)
DEPRECATED RDW RBC AUTO: 49.2 FL (ref 37–54)
EOSINOPHIL # BLD AUTO: 0.06 10*3/MM3 (ref 0–0.7)
EOSINOPHIL NFR BLD AUTO: 0.6 % (ref 0.3–6.2)
ERYTHROCYTE [DISTWIDTH] IN BLOOD BY AUTOMATED COUNT: 15.1 % (ref 11.7–13)
GFR SERPL CREATININE-BSD FRML MDRD: 110 ML/MIN/1.73
GLUCOSE BLD-MCNC: 96 MG/DL (ref 65–99)
HCT VFR BLD AUTO: 31.4 % (ref 35.6–45.5)
HGB BLD-MCNC: 10.4 G/DL (ref 11.9–15.5)
IMM GRANULOCYTES # BLD: 0.04 10*3/MM3 (ref 0–0.03)
IMM GRANULOCYTES NFR BLD: 0.4 % (ref 0–0.5)
LYMPHOCYTES # BLD AUTO: 1.78 10*3/MM3 (ref 0.9–4.8)
LYMPHOCYTES NFR BLD AUTO: 17.3 % (ref 19.6–45.3)
MCH RBC QN AUTO: 29.4 PG (ref 26.9–32)
MCHC RBC AUTO-ENTMCNC: 33.1 G/DL (ref 32.4–36.3)
MCV RBC AUTO: 88.7 FL (ref 80.5–98.2)
MONOCYTES # BLD AUTO: 1.09 10*3/MM3 (ref 0.2–1.2)
MONOCYTES NFR BLD AUTO: 10.6 % (ref 5–12)
NEUTROPHILS # BLD AUTO: 7.34 10*3/MM3 (ref 1.9–8.1)
NEUTROPHILS NFR BLD AUTO: 71.4 % (ref 42.7–76)
PLATELET # BLD AUTO: 342 10*3/MM3 (ref 140–500)
PMV BLD AUTO: 8.8 FL (ref 6–12)
POTASSIUM BLD-SCNC: 4 MMOL/L (ref 3.5–5.2)
RBC # BLD AUTO: 3.54 10*6/MM3 (ref 3.9–5.2)
SODIUM BLD-SCNC: 134 MMOL/L (ref 136–145)
WBC NRBC COR # BLD: 10.28 10*3/MM3 (ref 4.5–10.7)

## 2018-12-13 PROCEDURE — C1713 ANCHOR/SCREW BN/BN,TIS/BN: HCPCS | Performed by: ORTHOPAEDIC SURGERY

## 2018-12-13 PROCEDURE — 73502 X-RAY EXAM HIP UNI 2-3 VIEWS: CPT

## 2018-12-13 PROCEDURE — 76000 FLUOROSCOPY <1 HR PHYS/QHP: CPT

## 2018-12-13 PROCEDURE — 80048 BASIC METABOLIC PNL TOTAL CA: CPT | Performed by: INTERNAL MEDICINE

## 2018-12-13 PROCEDURE — 25010000002 FENTANYL CITRATE (PF) 100 MCG/2ML SOLUTION: Performed by: NURSE ANESTHETIST, CERTIFIED REGISTERED

## 2018-12-13 PROCEDURE — 25010000002 PHENYLEPHRINE PER 1 ML: Performed by: NURSE ANESTHETIST, CERTIFIED REGISTERED

## 2018-12-13 PROCEDURE — 25010000002 DEXAMETHASONE PER 1 MG: Performed by: NURSE ANESTHETIST, CERTIFIED REGISTERED

## 2018-12-13 PROCEDURE — 0QS706Z REPOSITION LEFT UPPER FEMUR WITH INTRAMEDULLARY INTERNAL FIXATION DEVICE, OPEN APPROACH: ICD-10-PCS | Performed by: ORTHOPAEDIC SURGERY

## 2018-12-13 PROCEDURE — 25010000002 PROPOFOL 10 MG/ML EMULSION: Performed by: NURSE ANESTHETIST, CERTIFIED REGISTERED

## 2018-12-13 PROCEDURE — 25010000003 CEFAZOLIN IN DEXTROSE 2-4 GM/100ML-% SOLUTION: Performed by: ORTHOPAEDIC SURGERY

## 2018-12-13 PROCEDURE — 25010000002 ONDANSETRON PER 1 MG: Performed by: NURSE ANESTHETIST, CERTIFIED REGISTERED

## 2018-12-13 PROCEDURE — 85025 COMPLETE CBC W/AUTO DIFF WBC: CPT | Performed by: INTERNAL MEDICINE

## 2018-12-13 DEVICE — TRIGEN LOW PROFILE SCREW 5.0MM X 30MM
Type: IMPLANTABLE DEVICE | Status: FUNCTIONAL
Brand: TRIGEN

## 2018-12-13 DEVICE — INTERTAN LAG/COMPRESSION SCREW KIT                                    100MM / 95MM
Type: IMPLANTABLE DEVICE | Status: FUNCTIONAL
Brand: TRIGEN

## 2018-12-13 DEVICE — TRIGEN INTERTAN 10S 10MM X 18CM 130DEGREE
Type: IMPLANTABLE DEVICE | Status: FUNCTIONAL
Brand: TRIGEN

## 2018-12-13 RX ORDER — RANOLAZINE 500 MG/1
1000 TABLET, EXTENDED RELEASE ORAL EVERY 12 HOURS SCHEDULED
Status: DISCONTINUED | OUTPATIENT
Start: 2018-12-13 | End: 2018-12-20 | Stop reason: HOSPADM

## 2018-12-13 RX ORDER — MAGNESIUM HYDROXIDE 1200 MG/15ML
LIQUID ORAL AS NEEDED
Status: DISCONTINUED | OUTPATIENT
Start: 2018-12-13 | End: 2018-12-13 | Stop reason: HOSPADM

## 2018-12-13 RX ORDER — OXYCODONE AND ACETAMINOPHEN 7.5; 325 MG/1; MG/1
1 TABLET ORAL ONCE AS NEEDED
Status: DISCONTINUED | OUTPATIENT
Start: 2018-12-13 | End: 2018-12-13 | Stop reason: HOSPADM

## 2018-12-13 RX ORDER — ONDANSETRON 2 MG/ML
4 INJECTION INTRAMUSCULAR; INTRAVENOUS ONCE AS NEEDED
Status: DISCONTINUED | OUTPATIENT
Start: 2018-12-13 | End: 2018-12-13 | Stop reason: HOSPADM

## 2018-12-13 RX ORDER — HYDROMORPHONE HYDROCHLORIDE 1 MG/ML
0.5 INJECTION, SOLUTION INTRAMUSCULAR; INTRAVENOUS; SUBCUTANEOUS
Status: DISCONTINUED | OUTPATIENT
Start: 2018-12-13 | End: 2018-12-13 | Stop reason: HOSPADM

## 2018-12-13 RX ORDER — EPHEDRINE SULFATE 50 MG/ML
5 INJECTION, SOLUTION INTRAVENOUS ONCE AS NEEDED
Status: DISCONTINUED | OUTPATIENT
Start: 2018-12-13 | End: 2018-12-13 | Stop reason: HOSPADM

## 2018-12-13 RX ORDER — PROPOFOL 10 MG/ML
VIAL (ML) INTRAVENOUS AS NEEDED
Status: DISCONTINUED | OUTPATIENT
Start: 2018-12-13 | End: 2018-12-13 | Stop reason: SURG

## 2018-12-13 RX ORDER — PANTOPRAZOLE SODIUM 40 MG/1
40 TABLET, DELAYED RELEASE ORAL
Status: DISCONTINUED | OUTPATIENT
Start: 2018-12-13 | End: 2018-12-20 | Stop reason: HOSPADM

## 2018-12-13 RX ORDER — HYDROCODONE BITARTRATE AND ACETAMINOPHEN 7.5; 325 MG/1; MG/1
1 TABLET ORAL ONCE AS NEEDED
Status: DISCONTINUED | OUTPATIENT
Start: 2018-12-13 | End: 2018-12-13 | Stop reason: HOSPADM

## 2018-12-13 RX ORDER — LIDOCAINE HYDROCHLORIDE 20 MG/ML
INJECTION, SOLUTION INFILTRATION; PERINEURAL AS NEEDED
Status: DISCONTINUED | OUTPATIENT
Start: 2018-12-13 | End: 2018-12-13 | Stop reason: SURG

## 2018-12-13 RX ORDER — ONDANSETRON 2 MG/ML
INJECTION INTRAMUSCULAR; INTRAVENOUS AS NEEDED
Status: DISCONTINUED | OUTPATIENT
Start: 2018-12-13 | End: 2018-12-13 | Stop reason: SURG

## 2018-12-13 RX ORDER — PROMETHAZINE HYDROCHLORIDE 25 MG/1
25 SUPPOSITORY RECTAL ONCE AS NEEDED
Status: DISCONTINUED | OUTPATIENT
Start: 2018-12-13 | End: 2018-12-13 | Stop reason: HOSPADM

## 2018-12-13 RX ORDER — PROMETHAZINE HYDROCHLORIDE 25 MG/ML
12.5 INJECTION, SOLUTION INTRAMUSCULAR; INTRAVENOUS ONCE AS NEEDED
Status: DISCONTINUED | OUTPATIENT
Start: 2018-12-13 | End: 2018-12-13 | Stop reason: HOSPADM

## 2018-12-13 RX ORDER — ROCURONIUM BROMIDE 10 MG/ML
INJECTION, SOLUTION INTRAVENOUS AS NEEDED
Status: DISCONTINUED | OUTPATIENT
Start: 2018-12-13 | End: 2018-12-13 | Stop reason: SURG

## 2018-12-13 RX ORDER — FAMOTIDINE 10 MG/ML
20 INJECTION, SOLUTION INTRAVENOUS ONCE
Status: COMPLETED | OUTPATIENT
Start: 2018-12-13 | End: 2018-12-13

## 2018-12-13 RX ORDER — CEFAZOLIN SODIUM 2 G/100ML
2 INJECTION, SOLUTION INTRAVENOUS ONCE
Status: COMPLETED | OUTPATIENT
Start: 2018-12-13 | End: 2018-12-13

## 2018-12-13 RX ORDER — MIDAZOLAM HYDROCHLORIDE 1 MG/ML
2 INJECTION INTRAMUSCULAR; INTRAVENOUS
Status: DISCONTINUED | OUTPATIENT
Start: 2018-12-13 | End: 2018-12-13 | Stop reason: HOSPADM

## 2018-12-13 RX ORDER — NITROGLYCERIN 0.4 MG/1
0.4 TABLET SUBLINGUAL
Status: DISCONTINUED | OUTPATIENT
Start: 2018-12-13 | End: 2018-12-20 | Stop reason: HOSPADM

## 2018-12-13 RX ORDER — SODIUM CHLORIDE, SODIUM LACTATE, POTASSIUM CHLORIDE, CALCIUM CHLORIDE 600; 310; 30; 20 MG/100ML; MG/100ML; MG/100ML; MG/100ML
9 INJECTION, SOLUTION INTRAVENOUS CONTINUOUS
Status: DISCONTINUED | OUTPATIENT
Start: 2018-12-13 | End: 2018-12-14

## 2018-12-13 RX ORDER — FLUMAZENIL 0.1 MG/ML
0.2 INJECTION INTRAVENOUS AS NEEDED
Status: DISCONTINUED | OUTPATIENT
Start: 2018-12-13 | End: 2018-12-13 | Stop reason: HOSPADM

## 2018-12-13 RX ORDER — DEXAMETHASONE SODIUM PHOSPHATE 4 MG/ML
INJECTION, SOLUTION INTRA-ARTICULAR; INTRALESIONAL; INTRAMUSCULAR; INTRAVENOUS; SOFT TISSUE AS NEEDED
Status: DISCONTINUED | OUTPATIENT
Start: 2018-12-13 | End: 2018-12-13 | Stop reason: SURG

## 2018-12-13 RX ORDER — IBUPROFEN 600 MG/1
600 TABLET ORAL ONCE AS NEEDED
Status: DISCONTINUED | OUTPATIENT
Start: 2018-12-13 | End: 2018-12-13 | Stop reason: HOSPADM

## 2018-12-13 RX ORDER — BUPIVACAINE HYDROCHLORIDE AND EPINEPHRINE 5; 5 MG/ML; UG/ML
INJECTION, SOLUTION PERINEURAL AS NEEDED
Status: DISCONTINUED | OUTPATIENT
Start: 2018-12-13 | End: 2018-12-13 | Stop reason: HOSPADM

## 2018-12-13 RX ORDER — ACETAMINOPHEN 325 MG/1
650 TABLET ORAL ONCE AS NEEDED
Status: DISCONTINUED | OUTPATIENT
Start: 2018-12-13 | End: 2018-12-13 | Stop reason: HOSPADM

## 2018-12-13 RX ORDER — METOPROLOL SUCCINATE 25 MG/1
25 TABLET, EXTENDED RELEASE ORAL DAILY
Status: DISCONTINUED | OUTPATIENT
Start: 2018-12-13 | End: 2018-12-20 | Stop reason: HOSPADM

## 2018-12-13 RX ORDER — DIPHENHYDRAMINE HCL 25 MG
25 CAPSULE ORAL
Status: DISCONTINUED | OUTPATIENT
Start: 2018-12-13 | End: 2018-12-13 | Stop reason: HOSPADM

## 2018-12-13 RX ORDER — PROMETHAZINE HYDROCHLORIDE 25 MG/1
25 TABLET ORAL ONCE AS NEEDED
Status: DISCONTINUED | OUTPATIENT
Start: 2018-12-13 | End: 2018-12-13 | Stop reason: HOSPADM

## 2018-12-13 RX ORDER — SODIUM CHLORIDE 0.9 % (FLUSH) 0.9 %
3 SYRINGE (ML) INJECTION EVERY 12 HOURS SCHEDULED
Status: DISCONTINUED | OUTPATIENT
Start: 2018-12-13 | End: 2018-12-13 | Stop reason: HOSPADM

## 2018-12-13 RX ORDER — UREA 10 %
3 LOTION (ML) TOPICAL NIGHTLY PRN
Status: DISCONTINUED | OUTPATIENT
Start: 2018-12-13 | End: 2018-12-20 | Stop reason: HOSPADM

## 2018-12-13 RX ORDER — NALOXONE HCL 0.4 MG/ML
0.2 VIAL (ML) INJECTION AS NEEDED
Status: DISCONTINUED | OUTPATIENT
Start: 2018-12-13 | End: 2018-12-13 | Stop reason: HOSPADM

## 2018-12-13 RX ORDER — MIDAZOLAM HYDROCHLORIDE 1 MG/ML
1 INJECTION INTRAMUSCULAR; INTRAVENOUS
Status: DISCONTINUED | OUTPATIENT
Start: 2018-12-13 | End: 2018-12-13 | Stop reason: HOSPADM

## 2018-12-13 RX ORDER — SODIUM CHLORIDE 0.9 % (FLUSH) 0.9 %
3-10 SYRINGE (ML) INJECTION AS NEEDED
Status: DISCONTINUED | OUTPATIENT
Start: 2018-12-13 | End: 2018-12-13 | Stop reason: HOSPADM

## 2018-12-13 RX ORDER — FENTANYL CITRATE 50 UG/ML
INJECTION, SOLUTION INTRAMUSCULAR; INTRAVENOUS AS NEEDED
Status: DISCONTINUED | OUTPATIENT
Start: 2018-12-13 | End: 2018-12-13 | Stop reason: SURG

## 2018-12-13 RX ORDER — ATORVASTATIN CALCIUM 20 MG/1
20 TABLET, FILM COATED ORAL NIGHTLY
Status: DISCONTINUED | OUTPATIENT
Start: 2018-12-13 | End: 2018-12-20 | Stop reason: HOSPADM

## 2018-12-13 RX ORDER — FENTANYL CITRATE 50 UG/ML
50 INJECTION, SOLUTION INTRAMUSCULAR; INTRAVENOUS
Status: DISCONTINUED | OUTPATIENT
Start: 2018-12-13 | End: 2018-12-13 | Stop reason: HOSPADM

## 2018-12-13 RX ORDER — HYDROCODONE BITARTRATE AND ACETAMINOPHEN 5; 325 MG/1; MG/1
1 TABLET ORAL EVERY 6 HOURS PRN
Status: DISCONTINUED | OUTPATIENT
Start: 2018-12-13 | End: 2018-12-20 | Stop reason: HOSPADM

## 2018-12-13 RX ORDER — CEFAZOLIN SODIUM 2 G/100ML
2 INJECTION, SOLUTION INTRAVENOUS EVERY 8 HOURS
Status: COMPLETED | OUTPATIENT
Start: 2018-12-13 | End: 2018-12-14

## 2018-12-13 RX ADMIN — PANTOPRAZOLE SODIUM 40 MG: 40 TABLET, DELAYED RELEASE ORAL at 18:07

## 2018-12-13 RX ADMIN — RANOLAZINE 1000 MG: 500 TABLET, FILM COATED, EXTENDED RELEASE ORAL at 11:15

## 2018-12-13 RX ADMIN — PROPOFOL 100 MG: 10 INJECTION, EMULSION INTRAVENOUS at 13:20

## 2018-12-13 RX ADMIN — FENTANYL CITRATE 25 MCG: 50 INJECTION INTRAMUSCULAR; INTRAVENOUS at 13:17

## 2018-12-13 RX ADMIN — PHENYLEPHRINE HYDROCHLORIDE 200 MCG: 10 INJECTION INTRAVENOUS at 13:57

## 2018-12-13 RX ADMIN — FENTANYL CITRATE 25 MCG: 50 INJECTION INTRAMUSCULAR; INTRAVENOUS at 13:44

## 2018-12-13 RX ADMIN — DOCUSATE SODIUM 100 MG: 100 CAPSULE, LIQUID FILLED ORAL at 21:36

## 2018-12-13 RX ADMIN — ONDANSETRON 4 MG: 2 INJECTION INTRAMUSCULAR; INTRAVENOUS at 13:47

## 2018-12-13 RX ADMIN — PHENYLEPHRINE HYDROCHLORIDE 200 MCG: 10 INJECTION INTRAVENOUS at 13:50

## 2018-12-13 RX ADMIN — DEXAMETHASONE SODIUM PHOSPHATE 8 MG: 4 INJECTION INTRA-ARTICULAR; INTRALESIONAL; INTRAMUSCULAR; INTRAVENOUS; SOFT TISSUE at 13:39

## 2018-12-13 RX ADMIN — ROCURONIUM BROMIDE 30 MG: 10 INJECTION INTRAVENOUS at 13:20

## 2018-12-13 RX ADMIN — PHENYLEPHRINE HYDROCHLORIDE 100 MCG: 10 INJECTION INTRAVENOUS at 14:01

## 2018-12-13 RX ADMIN — FENTANYL CITRATE 50 MCG: 50 INJECTION INTRAMUSCULAR; INTRAVENOUS at 13:50

## 2018-12-13 RX ADMIN — HYDROCODONE BITARTRATE AND ACETAMINOPHEN 1 TABLET: 5; 325 TABLET ORAL at 09:46

## 2018-12-13 RX ADMIN — FAMOTIDINE 20 MG: 10 INJECTION, SOLUTION INTRAVENOUS at 12:17

## 2018-12-13 RX ADMIN — HYDROCODONE BITARTRATE AND ACETAMINOPHEN 1 TABLET: 5; 325 TABLET ORAL at 03:00

## 2018-12-13 RX ADMIN — CEFAZOLIN SODIUM 2 G: 2 INJECTION, SOLUTION INTRAVENOUS at 13:13

## 2018-12-13 RX ADMIN — SODIUM CHLORIDE, PRESERVATIVE FREE 3 ML: 5 INJECTION INTRAVENOUS at 21:37

## 2018-12-13 RX ADMIN — SUGAMMADEX 200 MG: 100 INJECTION, SOLUTION INTRAVENOUS at 13:48

## 2018-12-13 RX ADMIN — PHENYLEPHRINE HYDROCHLORIDE 200 MCG: 10 INJECTION INTRAVENOUS at 13:38

## 2018-12-13 RX ADMIN — METOPROLOL SUCCINATE 25 MG: 25 TABLET, FILM COATED, EXTENDED RELEASE ORAL at 11:15

## 2018-12-13 RX ADMIN — CEFAZOLIN SODIUM 2 G: 2 INJECTION, SOLUTION INTRAVENOUS at 21:36

## 2018-12-13 RX ADMIN — LIDOCAINE HYDROCHLORIDE 100 MG: 20 INJECTION, SOLUTION INFILTRATION; PERINEURAL at 13:20

## 2018-12-13 RX ADMIN — SODIUM CHLORIDE, POTASSIUM CHLORIDE, SODIUM LACTATE AND CALCIUM CHLORIDE 9 ML/HR: 600; 310; 30; 20 INJECTION, SOLUTION INTRAVENOUS at 12:17

## 2018-12-13 NOTE — ANESTHESIA POSTPROCEDURE EVALUATION
Patient: Araceli Mitchell    Procedure Summary     Date:  12/13/18 Room / Location:  [facility] OR  / [facility] MAIN OR    Anesthesia Start:  1313 Anesthesia Stop:  1418    Procedure:  LT. HIP IM NAIL (Left Thigh) Diagnosis:       Closed fracture of left hip, initial encounter (CMS/[facility])      (Closed fracture of left hip, initial encounter (CMS/[facility]) [S72.002A])    Surgeon:  Suhsa Alonso II, MD Provider:  Gregg Atkins MD    Anesthesia Type:  general ASA Status:  4          Anesthesia Type: general  Last vitals  BP   141/81 (12/13/18 1201)   Temp   36.6 °C (97.9 °F) (12/13/18 1201)   Pulse   81 (12/13/18 1201)   Resp   20 (12/13/18 1201)     SpO2   94 % (12/13/18 1201)     Anesthesia Post Evaluation

## 2018-12-13 NOTE — PLAN OF CARE
Problem: Patient Care Overview  Goal: Plan of Care Review  Outcome: Ongoing (interventions implemented as appropriate)   12/13/18 0255   Coping/Psychosocial   Plan of Care Reviewed With patient   Plan of Care Review   Progress improving   OTHER   Outcome Summary Pt scheduled for sx today for L hip fx. NPO after midnight. Pain controlled with PO pain meds. VSS. Voiding per bedpan. Pt on strict bedrest. Will cont to monitor.      Goal: Individualization and Mutuality  Outcome: Ongoing (interventions implemented as appropriate)    Goal: Discharge Needs Assessment  Outcome: Ongoing (interventions implemented as appropriate)    Goal: Interprofessional Rounds/Family Conf  Outcome: Ongoing (interventions implemented as appropriate)      Problem: Fall Risk (Adult)  Goal: Identify Related Risk Factors and Signs and Symptoms  Outcome: Ongoing (interventions implemented as appropriate)    Goal: Absence of Fall  Outcome: Ongoing (interventions implemented as appropriate)      Problem: Fracture Orthopaedic (Adult)  Goal: Signs and Symptoms of Listed Potential Problems Will be Absent, Minimized or Managed (Fracture Orthopaedic)  Outcome: Ongoing (interventions implemented as appropriate)      Problem: Skin Injury Risk (Adult)  Goal: Identify Related Risk Factors and Signs and Symptoms  Outcome: Ongoing (interventions implemented as appropriate)    Goal: Skin Health and Integrity  Outcome: Ongoing (interventions implemented as appropriate)

## 2018-12-13 NOTE — PLAN OF CARE
Problem: Patient Care Overview  Goal: Plan of Care Review  Outcome: Ongoing (interventions implemented as appropriate)   12/13/18 0409   Coping/Psychosocial   Plan of Care Reviewed With patient;son   OTHER   Outcome Summary Patient alert and oriented. Vital signs are stable. Incision to left hip clean,dry, and intact. A-flutter on monitor. Denies pain. Will continue to monitor.       Problem: Fall Risk (Adult)  Goal: Identify Related Risk Factors and Signs and Symptoms  Outcome: Ongoing (interventions implemented as appropriate)    Goal: Absence of Fall  Outcome: Ongoing (interventions implemented as appropriate)      Problem: Fracture Orthopaedic (Adult)  Goal: Signs and Symptoms of Listed Potential Problems Will be Absent, Minimized or Managed (Fracture Orthopaedic)  Outcome: Ongoing (interventions implemented as appropriate)      Problem: Skin Injury Risk (Adult)  Goal: Identify Related Risk Factors and Signs and Symptoms  Outcome: Ongoing (interventions implemented as appropriate)    Goal: Skin Health and Integrity  Outcome: Ongoing (interventions implemented as appropriate)

## 2018-12-13 NOTE — PLAN OF CARE
Problem: Patient Care Overview  Goal: Plan of Care Review  Outcome: Ongoing (interventions implemented as appropriate)   12/12/18 1935   Coping/Psychosocial   Plan of Care Reviewed With patient;son   Plan of Care Review   Progress improving   OTHER   Outcome Summary Admitted to room 787 today from PACU. A&Ox4. C/o pain. Tolerated Iv diluadid thi afternoon. No sx today. Tolerating oral pain medication now. Good sensation and motion to ble. Left leg shorten and external rotated. Voiding on her own. Orientate to room and call light. Educated on hip fracture and pain management. Vitals are stable.      Goal: Individualization and Mutuality  Outcome: Ongoing (interventions implemented as appropriate)    Goal: Discharge Needs Assessment  Outcome: Ongoing (interventions implemented as appropriate)    Goal: Interprofessional Rounds/Family Conf  Outcome: Ongoing (interventions implemented as appropriate)      Problem: Fall Risk (Adult)  Goal: Identify Related Risk Factors and Signs and Symptoms  Outcome: Ongoing (interventions implemented as appropriate)    Goal: Absence of Fall  Outcome: Ongoing (interventions implemented as appropriate)      Problem: Fracture Orthopaedic (Adult)  Goal: Signs and Symptoms of Listed Potential Problems Will be Absent, Minimized or Managed (Fracture Orthopaedic)  Outcome: Ongoing (interventions implemented as appropriate)

## 2018-12-13 NOTE — ANESTHESIA PROCEDURE NOTES
ANESTHESIA INTUBATION  Urgency: elective    Date/Time: 12/13/2018 1:22 PM  Airway not difficult    General Information and Staff    Patient location during procedure: OR  Anesthesiologist: Gregg Atkins MD  CRNA: Florencia Moura CRNA    Indications and Patient Condition  Indications for airway management: airway protection    Preoxygenated: yes  Mask difficulty assessment: 2 - vent by mask + OA or adjuvant +/- NMBA    Final Airway Details  Final airway type: endotracheal airway      Successful airway: ETT  Cuffed: yes   Successful intubation technique: direct laryngoscopy  Facilitating devices/methods: intubating stylet  Endotracheal tube insertion site: oral  Blade: [place]  Blade size: 2  ETT size (mm): 7.0  Cormack-Lehane Classification: grade I - full view of glottis  Placement verified by: chest auscultation and capnometry   Cuff volume (mL): 4  Measured from: lips  ETT to lips (cm): 19  Number of attempts at approach: 1    Additional Comments  EBBS: ETCO2+: Atraumatic intubation; Lips and gums same as pre op

## 2018-12-13 NOTE — ANESTHESIA PREPROCEDURE EVALUATION
Anesthesia Evaluation     Patient summary reviewed and Nursing notes reviewed   no history of anesthetic complications:  NPO Solid Status: > 6 hours  NPO Liquid Status: > 6 hours           Airway   Mallampati: II  TM distance: >3 FB  Neck ROM: full  no difficulty expected and No difficulty expected  Dental - normal exam   (+) edentulous    Pulmonary - negative pulmonary ROS and normal exam    breath sounds clear to auscultation  (-) rhonchi, decreased breath sounds, wheezes, rales, stridor  Cardiovascular - normal exam    NYHA Classification: I  ECG reviewed  Patient on routine beta blocker and Beta blocker given within 24 hours of surgery  Rhythm: irregular  Rate: abnormal    (+) hypertension, past MI , CAD, dysrhythmias Atrial Fib, CHF, hyperlipidemia,   (-) murmur, weak pulses, friction rub, systolic click, carotid bruits, JVD, peripheral edema      Neuro/Psych- negative ROS  GI/Hepatic/Renal/Endo    (+)  GERD,      Musculoskeletal (-) negative ROS    Abdominal  - normal exam    Abdomen: soft.   Substance History - negative use     OB/GYN negative ob/gyn ROS         Other - negative ROS                       Anesthesia Plan    ASA 4     general     intravenous induction   Anesthetic plan, all risks, benefits, and alternatives have been provided, discussed and informed consent has been obtained with: patient.

## 2018-12-13 NOTE — OP NOTE
.HIP FRACTURE OPERATIVE NOTE  PATIENT NAME: Araceli Mitchell  MRN: [id number]  ATTENDING: John Quiroz MD  : 1926 AGE: 89 y.o. GENDER: female  DATE OF OPERATION: 2018  PREOPERATIVE DIAGNOSIS: Closed fracture of left hip, initial encounter (CMS/[facility]) [S72.002A]  POSTOPERATIVE DIAGNOSIS: Closed fracture of left hip, initial encounter (CMS/[facility]) [S72.002A]  OPERATION PERFORMED: Procedure(s):  LT. HIP IM NAIL  SURGEON: Suhas Alonso MD  Circulator: Odalys Diaz RN; Saba Perera RN  : Olivier Ronquillo  Radiology Technologist: Marcia Macias RRT  Scrub Person: Whitney Jarvis RN  ANESTHESIA: General  ESTIMATED BLOOD LOSS: MinimalmL  SPONGE AND NEEDLE COUNT: Correct  INDICATIONS: This patient was found to have an Intertrochanteric Hip Fracture hip after evaluation in the ER. An orthopaedic consult was placed for management. After a thorough medical workup and discussion of the surgical options, the patient was cleared and scheduled for ORIF of the operative hip.   COMPONENTS:   [employer] TriGen InterTAN nail: 10 mm x 18cm, 130°   TriGen InterTAN Lag/Compression Screw:  100/95mm  TriGen L-P screw: 5 mm x 25mm  PERTINENT FINDINGS: Hip fracture  DETAILS OF PROCEDURE:   The patient was met in the preoperative area. The site was marked. The consent and H&P were reviewed. The patient was then wheeled back to the operative suite underwent anesthesia. The [place] table boots were secured to the patients’ feet using Coban for extra friction. The patient was moved onto the [place] table and secured in the supine position. The perineal post was inserted and the boots were secured into the leg holders. Excess hair about the operative hip was removed using surgical clippers. The operative area was marked out using two 10x15 drapes. Surgical alcohol was used to thoroughly clean the operative area.    Before prepping of the extremity, the fracture was closed reduced as best as possible.  Visualization of reduction was obtained with fluoroscopy for both the AP and Lateral views.     The hip and leg was then prepped in the normal sterile fashion, which included Chloroprep and multiple layers of sterile drapes. The surgical incision was marked. A surgical timeout was performed in which administration of preoperative antibiotics and the surgical site were confirmed.    A small incision was made just proximal to the greater trochanter and a starting pin was drilled into the tip of the greater trochanter using fluoroscopy to confirm proper location on both the AP and Lateral views. The opening reamer was then used to open the canal down to the lesser trochanter, visualizing propper position again in the AP and Lateral views.     SHORT NAIL  A short nail was then inserted into the femur through the hole made by the opening reamer.  The lag screw and compression screw were placed into the femoral head after being drilled and measured, again using fluoroscopy to place the screws in optimal position within the femoral head on both the AP and Lateral views, close to center/center position. Traction was released at this point and the fracture was compressed through the nail. Finally the short nail was secured with one distal interlocking screw which was placed using the jig assembly for the short nail. Final X-Rays showed the fracture reduction to have been maintained and the implant in optimal position.     The wounds were irrigated and closed with 2-0 Vicryl and staples.  The wound was injected with Marcaine.  A sterile dressing was then placed over the incisions. The patient was moved from the [place] table to the [facility] where the boots were removed. The patient was taken to the recovery room in stable condition. There were no complications and the patient tolerated the procedure well.    Suhas Alonso II, MD  Orthopaedic Surgery  [facility]

## 2018-12-14 PROBLEM — D72.829 LEUKOCYTOSIS: Status: ACTIVE | Noted: 2018-12-14

## 2018-12-14 PROBLEM — R63.6 UNDERWEIGHT: Status: ACTIVE | Noted: 2018-12-14

## 2018-12-14 PROBLEM — G92.9 ENCEPHALOPATHY, TOXIC: Status: ACTIVE | Noted: 2018-12-14

## 2018-12-14 LAB
ANION GAP SERPL CALCULATED.3IONS-SCNC: 15 MMOL/L
BASOPHILS # BLD AUTO: 0.01 10*3/MM3 (ref 0–0.2)
BASOPHILS NFR BLD AUTO: 0 % (ref 0–1.5)
BUN BLD-MCNC: 7 MG/DL (ref 8–23)
BUN/CREAT SERPL: 9.7 (ref 7–25)
CALCIUM SPEC-SCNC: 8.9 MG/DL (ref 8.2–9.6)
CHLORIDE SERPL-SCNC: 93 MMOL/L (ref 98–107)
CO2 SERPL-SCNC: 23 MMOL/L (ref 22–29)
CREAT BLD-MCNC: 0.72 MG/DL (ref 0.57–1)
DEPRECATED RDW RBC AUTO: 48.9 FL (ref 37–54)
EOSINOPHIL # BLD AUTO: 0.02 10*3/MM3 (ref 0–0.7)
EOSINOPHIL NFR BLD AUTO: 0.1 % (ref 0.3–6.2)
ERYTHROCYTE [DISTWIDTH] IN BLOOD BY AUTOMATED COUNT: 15 % (ref 11.7–13)
GFR SERPL CREATININE-BSD FRML MDRD: 76 ML/MIN/1.73
GLUCOSE BLD-MCNC: 118 MG/DL (ref 65–99)
HCT VFR BLD AUTO: 32.8 % (ref 35.6–45.5)
HGB BLD-MCNC: 11 G/DL (ref 11.9–15.5)
IMM GRANULOCYTES # BLD: 0.07 10*3/MM3 (ref 0–0.03)
IMM GRANULOCYTES NFR BLD: 0.3 % (ref 0–0.5)
LYMPHOCYTES # BLD AUTO: 1.63 10*3/MM3 (ref 0.9–4.8)
LYMPHOCYTES NFR BLD AUTO: 8 % (ref 19.6–45.3)
MAGNESIUM SERPL-MCNC: 2 MG/DL (ref 1.7–2.3)
MCH RBC QN AUTO: 29.6 PG (ref 26.9–32)
MCHC RBC AUTO-ENTMCNC: 33.5 G/DL (ref 32.4–36.3)
MCV RBC AUTO: 88.2 FL (ref 80.5–98.2)
MONOCYTES # BLD AUTO: 1.52 10*3/MM3 (ref 0.2–1.2)
MONOCYTES NFR BLD AUTO: 7.4 % (ref 5–12)
NEUTROPHILS # BLD AUTO: 17.23 10*3/MM3 (ref 1.9–8.1)
NEUTROPHILS NFR BLD AUTO: 84.5 % (ref 42.7–76)
PLATELET # BLD AUTO: 366 10*3/MM3 (ref 140–500)
PMV BLD AUTO: 9.1 FL (ref 6–12)
POTASSIUM BLD-SCNC: 4.1 MMOL/L (ref 3.5–5.2)
RBC # BLD AUTO: 3.72 10*6/MM3 (ref 3.9–5.2)
SODIUM BLD-SCNC: 131 MMOL/L (ref 136–145)
WBC NRBC COR # BLD: 20.41 10*3/MM3 (ref 4.5–10.7)

## 2018-12-14 PROCEDURE — 80048 BASIC METABOLIC PNL TOTAL CA: CPT | Performed by: INTERNAL MEDICINE

## 2018-12-14 PROCEDURE — 93005 ELECTROCARDIOGRAM TRACING: CPT | Performed by: INTERNAL MEDICINE

## 2018-12-14 PROCEDURE — 25010000002 HYDROMORPHONE PER 4 MG: Performed by: INTERNAL MEDICINE

## 2018-12-14 PROCEDURE — 97162 PT EVAL MOD COMPLEX 30 MIN: CPT

## 2018-12-14 PROCEDURE — 93010 ELECTROCARDIOGRAM REPORT: CPT | Performed by: INTERNAL MEDICINE

## 2018-12-14 PROCEDURE — 97110 THERAPEUTIC EXERCISES: CPT

## 2018-12-14 PROCEDURE — 25010000003 CEFAZOLIN IN DEXTROSE 2-4 GM/100ML-% SOLUTION: Performed by: ORTHOPAEDIC SURGERY

## 2018-12-14 PROCEDURE — 36415 COLL VENOUS BLD VENIPUNCTURE: CPT | Performed by: INTERNAL MEDICINE

## 2018-12-14 PROCEDURE — 83735 ASSAY OF MAGNESIUM: CPT | Performed by: INTERNAL MEDICINE

## 2018-12-14 PROCEDURE — 85025 COMPLETE CBC W/AUTO DIFF WBC: CPT | Performed by: INTERNAL MEDICINE

## 2018-12-14 RX ORDER — FUROSEMIDE 20 MG/1
20 TABLET ORAL EVERY MORNING
Status: DISCONTINUED | OUTPATIENT
Start: 2018-12-14 | End: 2018-12-20 | Stop reason: HOSPADM

## 2018-12-14 RX ADMIN — Medication 3 MG: at 20:22

## 2018-12-14 RX ADMIN — CEFAZOLIN SODIUM 2 G: 2 INJECTION, SOLUTION INTRAVENOUS at 05:46

## 2018-12-14 RX ADMIN — PANTOPRAZOLE SODIUM 40 MG: 40 TABLET, DELAYED RELEASE ORAL at 07:27

## 2018-12-14 RX ADMIN — SODIUM CHLORIDE, PRESERVATIVE FREE 3 ML: 5 INJECTION INTRAVENOUS at 08:34

## 2018-12-14 RX ADMIN — APIXABAN 2.5 MG: 2.5 TABLET, FILM COATED ORAL at 20:23

## 2018-12-14 RX ADMIN — SODIUM CHLORIDE, PRESERVATIVE FREE 3 ML: 5 INJECTION INTRAVENOUS at 20:23

## 2018-12-14 RX ADMIN — APIXABAN 2.5 MG: 2.5 TABLET, FILM COATED ORAL at 14:22

## 2018-12-14 RX ADMIN — FUROSEMIDE 20 MG: 20 TABLET ORAL at 14:22

## 2018-12-14 RX ADMIN — CEFAZOLIN SODIUM 2 G: 2 INJECTION, SOLUTION INTRAVENOUS at 12:39

## 2018-12-14 RX ADMIN — DOCUSATE SODIUM 100 MG: 100 CAPSULE, LIQUID FILLED ORAL at 20:23

## 2018-12-14 RX ADMIN — PANTOPRAZOLE SODIUM 40 MG: 40 TABLET, DELAYED RELEASE ORAL at 16:49

## 2018-12-14 RX ADMIN — ATORVASTATIN CALCIUM 20 MG: 20 TABLET, FILM COATED ORAL at 20:22

## 2018-12-14 RX ADMIN — RANOLAZINE 1000 MG: 500 TABLET, FILM COATED, EXTENDED RELEASE ORAL at 20:22

## 2018-12-14 RX ADMIN — ROPINIROLE 0.75 MG: 0.5 TABLET, FILM COATED ORAL at 20:22

## 2018-12-14 RX ADMIN — DOCUSATE SODIUM 100 MG: 100 CAPSULE, LIQUID FILLED ORAL at 07:27

## 2018-12-14 RX ADMIN — RANOLAZINE 1000 MG: 500 TABLET, FILM COATED, EXTENDED RELEASE ORAL at 07:27

## 2018-12-14 RX ADMIN — METOPROLOL SUCCINATE 25 MG: 25 TABLET, FILM COATED, EXTENDED RELEASE ORAL at 07:27

## 2018-12-14 RX ADMIN — HYDROMORPHONE HYDROCHLORIDE 0.5 MG: 1 INJECTION, SOLUTION INTRAMUSCULAR; INTRAVENOUS; SUBCUTANEOUS at 02:21

## 2018-12-14 NOTE — PROGRESS NOTES
Discharge Planning Assessment  [facility]     Patient Name: Araceli Mitchell  MRN: [id number]  Today's Date: 12/14/2018    Admit Date: 12/12/2018    Discharge Needs Assessment     Row Name 12/14/18 0856       Living Environment    Lives With  child(ang), adult    Name(s) of Who Lives With Patient  Son  ( Jordan Miller)    Current Living Arrangements  home/apartment/condo    Primary Care Provided by  self    Provides Primary Care For  no one    Family Caregiver if Needed  child(ang), adult    Family Caregiver Names  Son  ( Jordan Miller [phone])    Quality of Family Relationships  supportive    Able to Return to Prior Arrangements  yes    Living Arrangement Comments  Pt lives in first floor apartment with son  ( Jordan Miller)       Resource/Environmental Concerns    Resource/Environmental Concerns  none       Transition Planning    Patient/Family Anticipates Transition to  home with help/services;inpatient rehabilitation facility    Patient/Family Anticipated Services at Transition  none    Transportation Anticipated  family or friend will provide       Discharge Needs Assessment    Readmission Within the Last 30 Days  no previous admission in last 30 days    Concerns to be Addressed  denies needs/concerns at this time    Equipment Currently Used at Home  cane, straight;shower chair;walker, rolling    Anticipated Changes Related to Illness  none    Equipment Needed After Discharge  cane, straight;shower chair;walker, rolling        Discharge Plan     Row Name 12/14/18 0900       Plan    Plan  Plan home with HH or SNF if skilled care needed.  JENNIFER Pizano    Patient/Family in Agreement with Plan  yes    Plan Comments  FACE SHEET VERIFIED/ IM LETTER SIGNED.  Spoke to pt and she gave permission to call her son.  Spoke to pt's son per phone  ( Jordan Miller [phone]).  Son states pt lives in first floor apartment with him.  Pt independent with ADL's.  Pt has a straight cane, shower chair, and rolling walker,  Pt gets  prescriptions at [facility] on [address].  Pt's son denies any issues affording medications.  Pt is not current with HH.  Pt has not been in SNF.   CCP to follow for HH choice and SNF choice.  Harinder, JENNIFER        Destination      No service coordination in this encounter.      Durable Medical Equipment      No service coordination in this encounter.      Dialysis/Infusion      No service coordination in this encounter.      Home Medical Care      No service coordination in this encounter.      Community Resources      No service coordination in this encounter.          Demographic Summary     Row Name 12/14/18 0855       General Information    Admission Type  inpatient    Arrived From  emergency department    Required Notices Provided  Important Message from Medicare    Referral Source  admission list    Reason for Consult  discharge planning    Preferred Language  English     Used During This Interaction  no        Functional Status     Row Name 12/14/18 0856       Functional Status    Usual Activity Tolerance  moderate    Current Activity Tolerance  fair       Functional Status, IADL    Medications  assistive person    Meal Preparation  assistive person    Housekeeping  assistive person    Laundry  assistive person    Shopping  assistive person       Mental Status    General Appearance WDL  WDL       Mental Status Summary    Recent Changes in Mental Status/Cognitive Functioning  ability to understand;decision making/judgment        Psychosocial    No documentation.       Abuse/Neglect    No documentation.       Legal    No documentation.       Substance Abuse    No documentation.       Patient Forms    No documentation.           Caroline Howard, RN

## 2018-12-14 NOTE — PROGRESS NOTES
Continued Stay Note  [facility]     Patient Name: Araceli Mitchell  MRN: [id number]  Today's Date: 12/14/2018    Admit Date: 12/12/2018    Discharge Plan     Row Name 12/14/18 0900       Plan    Plan  Plan home with HH or SNF if skilled care needed.  JENNIFER Pizano    Patient/Family in Agreement with Plan  yes    Plan Comments  FACE SHEET VERIFIED/ IM LETTER SIGNED.  Spoke to pt and she gave permission to call her son.  Spoke to pt's son per phone  ( Jordan Miller [phone]).  Son states pt lives in first floor apartment with him.  Pt independent with ADL's.  Pt has a straight cane, shower chair, and rolling walker,  Pt gets prescriptions at [facility].  Pt's son denies any issues affording medications.  Pt is not current with HH.  Pt has not been in SNF.   CCP to follow for HH choice and SNF choice.  EJNNIFER Pizano        Discharge Codes    No documentation.             Caroline Howard RN

## 2018-12-14 NOTE — PLAN OF CARE
Problem: Patient Care Overview  Goal: Plan of Care Review  Outcome: Ongoing (interventions implemented as appropriate)   12/14/18 0653   Coping/Psychosocial   Plan of Care Reviewed With patient   Plan of Care Review   Progress no change   OTHER   Outcome Summary Patient disoriented to time, situation and place. Climbing out of bed, took out surgical dressing and 2 IV sites .. Refused to take oral meds. call placed to son without any response.  Medicated  Once  For  Pain.  Remain in A-flutter. Nursing will conytinue to monitor.     Goal: Individualization and Mutuality  Outcome: Ongoing (interventions implemented as appropriate)    Goal: Discharge Needs Assessment  Outcome: Ongoing (interventions implemented as appropriate)    Goal: Interprofessional Rounds/Family Conf  Outcome: Ongoing (interventions implemented as appropriate)      Problem: Fall Risk (Adult)  Goal: Identify Related Risk Factors and Signs and Symptoms  Outcome: Outcome(s) achieved Date Met: 12/14/18    Goal: Absence of Fall  Outcome: Ongoing (interventions implemented as appropriate)      Problem: Fracture Orthopaedic (Adult)  Goal: Signs and Symptoms of Listed Potential Problems Will be Absent, Minimized or Managed (Fracture Orthopaedic)  Outcome: Ongoing (interventions implemented as appropriate)      Problem: Skin Injury Risk (Adult)  Goal: Identify Related Risk Factors and Signs and Symptoms  Outcome: Ongoing (interventions implemented as appropriate)    Goal: Skin Health and Integrity  Outcome: Ongoing (interventions implemented as appropriate)      Problem: Pain, Acute (Adult)  Goal: Identify Related Risk Factors and Signs and Symptoms  Outcome: Ongoing (interventions implemented as appropriate)    Goal: Acceptable Pain Control/Comfort Level  Outcome: Ongoing (interventions implemented as appropriate)      Problem: Cardiac: Heart Failure (Adult)  Goal: Signs and Symptoms of Listed Potential Problems Will be Absent, Minimized or Managed  (Cardiac: Heart Failure)  Outcome: Ongoing (interventions implemented as appropriate)

## 2018-12-14 NOTE — DISCHARGE PLACEMENT REQUEST
"Araceli Brewer (89 y.o. Female)     Date of Birth Social Security Number Address Home Phone MRN    09/24/1926  [address] [facility] [address]  [place] [address] [phone] [id number]    Gnosticist Marital Status          None Single       Admission Date Admission Type Admitting Provider Attending Provider Department, Room/Bed    12/12/18 Emergency John Quiroz MD Lykins, Matthew D, MD [address], [address]    Discharge Date Discharge Disposition Discharge Destination                       Attending Provider:  John Quiroz MD    Allergies:  Clopidogrel Bisulfate, Lisinopril    Isolation:  None   Infection:  None   Code Status:  No CPR    Ht:  152.4 cm (60\")   Wt:  47.4 kg (104 lb 8 oz)    Admission Cmt:  None   Principal Problem:  Closed left hip fracture, initial encounter (CMS/[facility]) [S72.002A]                 Active Insurance as of 12/12/2018     Primary Coverage     Payor Plan Insurance Group Employer/Plan Group    MEDICARE MEDICARE A & B      Payor Plan Address Payor Plan Phone Number Payor Plan Fax Number Effective Dates    [address] [phone]  9/1/1991 - None Entered    [place] [address]       Subscriber Name Subscriber Birth Date Member ID       ARACELI BREWER 9/24/1926 [id number]                 Emergency Contacts      (Rel.) Home Phone Work Phone Mobile Phone    Jordan Miller (Son) [phone] -- --              "

## 2018-12-14 NOTE — THERAPY EVALUATION
Acute Care - Physical Therapy Initial Evaluation  [facility]     Patient Name: Araceli Mitchell  : 1926  MRN: [id number]  Today's Date: 2018   Onset of Illness/Injury or Date of Surgery: 18     Referring Physician: Gavin      Admit Date: 2018    Visit Dx:     ICD-10-CM ICD-9-CM   1. Closed fracture of left hip, initial encounter (CMS/[facility]) S72.002A 820.8   2. S/p left hip fracture Z87.81 V15.51   3. Fall, initial encounter W19.XXXA E888.9   4. Decreased mobility R26.89 781.99     Patient Active Problem List   Diagnosis   • NSTEMI (non-ST elevated myocardial infarction) (CMS/[facility])   • CAD (coronary artery disease)   • Ischemic cardiomyopathy   • Metabolic encephalopathy   • Chronic a-fib (CMS/[facility])   • Hyponatremia   • Hyperlipidemia   • Pulmonary HTN (CMS/[facility])   • GERD (gastroesophageal reflux disease)   • RLS (restless legs syndrome)   • Pulmonary fibrosis (CMS/HCC)   • Closed fracture of left hip (CMS/HCC)   • Closed left hip fracture, initial encounter (CMS/HCC)   • DNR (do not resuscitate)   • Chronic systolic CHF (congestive heart failure) (CMS/[facility])   • Underweight   • Leukocytosis   • Encephalopathy, toxic     Past Medical History:   Diagnosis Date   • CHF (congestive heart failure) (CMS/[facility])    • Hyperlipidemia    • Hypertension      Past Surgical History:   Procedure Laterality Date   • CARDIAC SURGERY      stent placement        PT ASSESSMENT (last 12 hours)      Physical Therapy Evaluation     Row Name 18 1344          PT Evaluation Time/Intention    Subjective Information  no complaints  -EM     Document Type  evaluation  -EM     Mode of Treatment  physical therapy  -EM     Patient Effort  good  -EM     Row Name 18 1344          General Information    Onset of Illness/Injury or Date of Surgery  18  -EM     Referring Physician  Gavin  -EM     Patient Observations  alert;cooperative;agree to therapy  -EM     Patient/Family Observations  son at bedside   -EM      General Observations of Patient  thin elderly female in supine, awake and alert   -EM     Prior Level of Function  independent:;all household mobility  -EM     Equipment Currently Used at Home  walker, rolling  -EM     Pertinent History of Current Functional Problem  L hip IM nail  -EM     Existing Precautions/Restrictions  fall  -EM     Row Name 12/14/18 1344          Relationship/Environment    Lives With  child(ang), adult  -EM     Row Name 12/14/18 1344          Resource/Environmental Concerns    Current Living Arrangements  home/apartment/condo  -EM     Row Name 12/14/18 1344          Cognitive Assessment/Intervention- PT/OT    Orientation Status (Cognition)  oriented to;person  -EM     Follows Commands (Cognition)  follows one step commands;75-90% accuracy  -EM     Safety Deficit (Cognitive)  mild deficit;insight into deficits/self awareness  -EM     Row Name 12/14/18 1344          Mobility Assessment/Treatment    Extremity Weight-bearing Status  left lower extremity  -EM     Left Lower Extremity (Weight-bearing Status)  weight-bearing as tolerated (WBAT)  -     Row Name 12/14/18 1344          Bed Mobility Assessment/Treatment    Bed Mobility Assessment/Treatment  supine-sit;sit-supine  -EM     Supine-Sit Grahn (Bed Mobility)  moderate assist (50% patient effort)  -     Row Name 12/14/18 1344          Transfer Assessment/Treatment    Transfer Assessment/Treatment  sit-stand transfer;stand-sit transfer  -EM     Sit-Stand Grahn (Transfers)  minimum assist (75% patient effort)  -EM     Stand-Sit Grahn (Transfers)  minimum assist (75% patient effort)  -     Row Name 12/14/18 1344          Sit-Stand Transfer    Assistive Device (Sit-Stand Transfers)  walker, front-wheeled  -     Row Name 12/14/18 1344          Stand-Sit Transfer    Assistive Device (Stand-Sit Transfers)  walker, front-wheeled  -[facility] Name 12/14/18 1344          Gait/Stairs Assessment/Training    Grahn Level  (Gait)  minimum assist (75% patient effort);1 person to manage equipment  -EM     Assistive Device (Gait)  walker, front-wheeled  -EM     Distance in Feet (Gait)  20  -EM     Deviations/Abnormal Patterns (Gait)  antalgic  -EM     Bilateral Gait Deviations  forward flexed posture  -EM     Comment (Gait/Stairs)  pt very weak in last 5 feet on ambulation, very forward flexed, difficulty advancing feet to back up to chair   -EM     Row Name 12/14/18 1344          General ROM    GENERAL ROM COMMENTS  ROM WNL   -EM     Row Name 12/14/18 1344          MMT (Manual Muscle Testing)    General MMT Comments  no focal deficits identified   -EM     Row Name 12/14/18 1344          Motor Assessment/Intervention    Additional Documentation  Balance (Group);Therapeutic Exercise (Group);Therapeutic Exercise Interventions (Group)  -EM     Row Name 12/14/18 1344          Therapeutic Exercise    Comment (Therapeutic Exercise)  10 reps per hip protocol, cues to slow down   -EM     Row Name 12/14/18 1344          Balance    Balance  static sitting balance;static standing balance  -EM     Row Name 12/14/18 1344          Static Sitting Balance    Level of O'Brien (Unsupported Sitting, Static Balance)  supervision  -EM     Sitting Position (Unsupported Sitting, Static Balance)  sitting on edge of bed  -EM     Row Name 12/14/18 1344          Static Standing Balance    Level of O'Brien (Supported Standing, Static Balance)  contact guard assist  -EM     Assistive Device Utilized (Supported Standing, Static Balance)  rolling walker  -EM     Row Name 12/14/18 1344          Pain Assessment    Additional Documentation  Pain Scale: Word Pre/Post-Treatment (Group)  -EM     Row Name 12/14/18 1344          Pain Scale: Numbers Pre/Post-Treatment    Pain Location - Side  Left  -EM     Pain Location  hip  -EM     Pain Intervention(s)  Repositioned;Ambulation/increased activity  -EM     Row Name 12/14/18 1344          Pain Scale: Word  Pre/Post-Treatment    Pain: Word Scale, Pretreatment  2 - mild pain  -EM     Row Name             Wound 12/13/18 1406 Left hip incision    Wound - Properties Group Date first assessed: 12/13/18  -LD Time first assessed: 1406  -LD Side: Left  -LD Location: hip  -LD Type: incision  -LD    Row Name 12/14/18 1344          Plan of Care Review    Plan of Care Reviewed With  patient;son  -EM     Row Name 12/14/18 1344          Physical Therapy Clinical Impression    Patient/Family Goals Statement (PT Clinical Impression)  go home   -EM     Criteria for Skilled Interventions Met (PT Clinical Impression)  yes;treatment indicated  -EM     Impairments Found (describe specific impairments)  gait, locomotion, and balance;arousal, attention, and cognition  -EM     Rehab Potential (PT Clinical Summary)  good, to achieve stated therapy goals  -EM     Row Name 12/14/18 1344          Physical Therapy Goals    Bed Mobility Goal Selection (PT)  bed mobility, PT goal 1  -EM     Transfer Goal Selection (PT)  transfer, PT goal 1  -EM     Gait Training Goal Selection (PT)  gait training, PT goal 1  -EM     Row Name 12/14/18 1524          Bed Mobility Goal 1 (PT)    Activity/Assistive Device (Bed Mobility Goal 1, PT)  bed mobility activities, all  -EM     [place] Level/Cues Needed (Bed Mobility Goal 1, PT)  contact guard assist  -EM     Time Frame (Bed Mobility Goal 1, PT)  5 days  -EM     Row Name 12/14/18 0378          Transfer Goal 1 (PT)    Activity/Assistive Device (Transfer Goal 1, PT)  transfers, all;walker, rolling  -EM     [place] Level/Cues Needed (Transfer Goal 1, PT)  supervision required  -EM     Time Frame (Transfer Goal 1, PT)  5 days  -EM     Row Name 12/14/18 5851          Gait Training Goal 1 (PT)    Activity/Assistive Device (Gait Training Goal 1, PT)  walker, rolling  -EM     [place] Level (Gait Training Goal 1, PT)  contact guard assist  -EM     Distance (Gait Goal 1, PT)  100  -EM     Time Frame (Gait  Training Goal 1, PT)  5 days  -EM     Row Name 12/14/18 1344          Positioning and Restraints    Pre-Treatment Position  in bed  -EM     Post Treatment Position  chair  -EM     In Chair  reclined;call light within reach;exit alarm on;with family/caregiver;notified nsg  -EM       User Key  (r) = Recorded By, (t) = Taken By, (c) = Cosigned By    Initials Name Provider Type    EM Thania Rojo PT Physical Therapist    Odalys Fong RN Registered Nurse        Physical Therapy Education     Title: PT OT SLP Therapies (In Progress)     Topic: Physical Therapy (In Progress)     Point: Mobility training (In Progress)     Learning Progress Summary           Patient Acceptance, E, NR by EM at 12/14/2018  1:57 PM                   Point: Home exercise program (In Progress)     Learning Progress Summary           Patient Acceptance, E, NR by EM at 12/14/2018  1:57 PM                               User Key     Initials Effective Dates Name Provider Type Discipline    EM 04/03/18 -  Thania Rojo PT Physical Therapist PT              PT Recommendation and Plan  Anticipated Discharge Disposition (PT): skilled nursing facility, home with home health  Planned Therapy Interventions (PT Eval): bed mobility training, gait training, home exercise program, transfer training  Therapy Frequency (PT Clinical Impression): daily  Outcome Summary/Treatment Plan (PT)  Anticipated Discharge Disposition (PT): skilled nursing facility, home with home health  Plan of Care Reviewed With: patient, son  Outcome Summary: patient presents with generalized post op weakness, decreased activity tolerance, decreased balance which limit independence with functional mobility and patient would benefit from skilled PT. Patient lives at home with son, ambulates with rwx. Patient may benefit from SNU at d/c, will assess progress over next couple of days.   Outcome Measures     Row Name 12/14/18 1300             How much help from another  person do you currently need...    Turning from your back to your side while in flat bed without using bedrails?  3  -EM      Moving from lying on back to sitting on the side of a flat bed without bedrails?  3  -EM      Moving to and from a bed to a chair (including a wheelchair)?  3  -EM      Standing up from a chair using your arms (e.g., wheelchair, bedside chair)?  3  -EM      Climbing 3-5 steps with a railing?  2  -EM      To walk in hospital room?  3  -EM      AM-PAC 6 Clicks Score  17  -EM         Functional Assessment    Outcome Measure Options  AM-PAC 6 Clicks Basic Mobility (PT)  -EM        User Key  (r) = Recorded By, (t) = Taken By, (c) = Cosigned By    Initials Name Provider Type    Thania Poole PT Physical Therapist         Time Calculation:   PT Charges     Row Name 12/14/18 1400             Time Calculation    Start Time  1315  -EM      Stop Time  1342  -EM      Time Calculation (min)  27 min  -EM      PT Received On  12/14/18  -EM      PT - Next Appointment  12/15/18  -EM      PT Goal Re-Cert Due Date  12/19/18  -EM         Time Calculation- PT    Total Timed Code Minutes- PT  15 minute(s)  -EM        User Key  (r) = Recorded By, (t) = Taken By, (c) = Cosigned By    Initials Name Provider Type    Thania Poole PT Physical Therapist        Therapy Suggested Charges     Code   Minutes Charges    None           Therapy Charges for Today     Code Description Service Date Service Provider Modifiers Qty    84095217765 HC PT EVAL MOD COMPLEXITY 2 12/14/2018 Thania Rojo, PT GP 1    54712156266 HC PT THER PROC EA 15 MIN 12/14/2018 Thania Rojo, PT GP 1    40530911184 HC PT THER SUPP EA 15 MIN 12/14/2018 Thania Rojo, PT GP 1          PT G-Codes  Outcome Measure Options: AM-PAC 6 Clicks Basic Mobility (PT)  AM-PAC 6 Clicks Score: 17      Thania Rojo PT  12/14/2018

## 2018-12-14 NOTE — PROGRESS NOTES
Continued Stay Note  [facility]     Patient Name: Araceli Mitchell  MRN: [id number]  Today's Date: 12/14/2018    Admit Date: 12/12/2018    Discharge Plan     Row Name 12/14/18 1319       Plan    Plan  Plan home with [facility] or [facility] for skilled care.   JENNIFER Pizano    Plan Comments  Spoke to pt and pt's son  at bedside.  Provided a HH list and [facility] for reference.  Pt's requesting [facility] for skilled care.  Nafisa  ( [phone]) called to follow for [facility].  Pt also requesting [facility] to follow.  Abigail called to follow for [facility].  Plan home with [facility] or [facility] for skilled care.   JENNIFER Pizano        Discharge Codes    No documentation.             Caroline Howard, RN

## 2018-12-14 NOTE — PLAN OF CARE
Problem: Patient Care Overview  Goal: Plan of Care Review  Outcome: Ongoing (interventions implemented as appropriate)   12/14/18 2902   Coping/Psychosocial   Plan of Care Reviewed With patient;son   Plan of Care Review   Progress improving   OTHER   Outcome Summary Patient alert. Patient's orientation improved when son arrived. Patient denies pain. Patient a-fib on monitor. Patient worked with PT. Patien ambulated in the kwon. Patient on iv antibiotics. Incision cdi with some dried drainage. Vital signs are stable. Will continue to monitor.        Problem: Fall Risk (Adult)  Goal: Absence of Fall  Outcome: Ongoing (interventions implemented as appropriate)      Problem: Fracture Orthopaedic (Adult)  Goal: Signs and Symptoms of Listed Potential Problems Will be Absent, Minimized or Managed (Fracture Orthopaedic)  Outcome: Ongoing (interventions implemented as appropriate)      Problem: Skin Injury Risk (Adult)  Goal: Identify Related Risk Factors and Signs and Symptoms  Outcome: Ongoing (interventions implemented as appropriate)    Goal: Skin Health and Integrity  Outcome: Ongoing (interventions implemented as appropriate)      Problem: Pain, Acute (Adult)  Goal: Identify Related Risk Factors and Signs and Symptoms  Outcome: Ongoing (interventions implemented as appropriate)    Goal: Acceptable Pain Control/Comfort Level  Outcome: Ongoing (interventions implemented as appropriate)

## 2018-12-14 NOTE — PROGRESS NOTES
Name: Araceli Mitchell ADMIT: 2018   : 1926  PCP: Provider, No Known    MRN: [id number] LOS: 2 days   AGE/SEX: 89 y.o. female  ROOM: E649/1   Subjective   CC: left hip pain  Patient went to OR yesterday and tolerated well.  No major events on telemetry overnight.  Pain is under reasonable control.  She did have some confusion overnight and earlier this AM which seems to have improved. No CP/dyspnea/orthopnea/f/c/n/v/d.      Objective   Vital Signs  Temp:  [97.4 °F (36.3 °C)-98.8 °F (37.1 °C)] 97.4 °F (36.3 °C)  Heart Rate:  [57-91] 70  Resp:  [12-18] 16  BP: (115-176)/(57-99) 119/57  SpO2:  [92 %-99 %] 96 %  on  Flow (L/min):  [1-4] 1;   Device (Oxygen Therapy): room air  Body mass index is 20.41 kg/m².    Physical Exam   Constitutional: She is oriented to person, place, and time. No distress.   HENT:   Head: Normocephalic and atraumatic.   Mouth/Throat: Oropharynx is clear and moist.   Eyes: Conjunctivae and EOM are normal. Pupils are equal, round, and reactive to light.   Neck: Normal range of motion. Neck supple.   Cardiovascular: Normal rate, regular rhythm and intact distal pulses.   Pulmonary/Chest: Effort normal and breath sounds normal. She has no rales.   Abdominal: Soft. Bowel sounds are normal.   Musculoskeletal: She exhibits no edema or tenderness.   LLE with surgical dressings c/d/i; neurovascularly intact   Neurological: She is alert and oriented to person, place, and time.   Skin: Skin is warm and dry. She is not diaphoretic.   Psychiatric: She has a normal mood and affect. Her behavior is normal.   Nursing note and vitals reviewed.      Results Review:       I reviewed the patient's new clinical results.  Results from last 7 days   Lab Units  18   0527  18   0450  18   1113   WBC 10*3/mm3  20.41*  10.28  11.23*   HEMOGLOBIN g/dL  11.0*  10.4*  13.5   PLATELETS 10*3/mm3  366  342  392     Results from last 7 days   Lab Units  18   0527  18   0450  18    1630   SODIUM mmol/L  131*  134*  132*   POTASSIUM mmol/L  4.1  4.0  3.8   CHLORIDE mmol/L  93*  97*  94*   CO2 mmol/L  23.0  25.6  25.4   BUN mg/dL  7*  8  7*   CREATININE mg/dL  0.72  0.52*  0.62   GLUCOSE mg/dL  118*  96  124*   Estimated Creatinine Clearance: 33.6 mL/min (by C-G formula based on SCr of 0.72 mg/dL).  Results from last 7 days   Lab Units  12/12/18   1630   ALBUMIN g/dL  3.00*   BILIRUBIN mg/dL  0.6   ALK PHOS U/L  67   AST (SGOT) U/L  17   ALT (SGPT) U/L  10     Results from last 7 days   Lab Units  12/14/18   0527  12/13/18   0450  12/12/18   1630   CALCIUM mg/dL  8.9  8.3  8.8   ALBUMIN g/dL   --    --   3.00*   MAGNESIUM mg/dL  2.0   --   2.1           atorvastatin 20 mg Oral Nightly   ceFAZolin 2 g Intravenous Q8H   docusate sodium 100 mg Oral BID   metoprolol succinate XL 25 mg Oral Daily   pantoprazole 40 mg Oral BID AC   ranolazine 1,000 mg Oral Q12H   rOPINIRole 0.75 mg Oral Nightly   sodium chloride 3 mL Intravenous Q12H       lactated ringers 9 mL/hr Last Rate: 125 mL/hr (12/13/18 1313)   Diet Regular      Assessment/Plan      Active Hospital Problems    Diagnosis Date Noted   • **Closed left hip fracture, initial encounter (CMS/[facility]) [S72.002A] 12/12/2018   • Underweight [R63.6] 12/14/2018   • Leukocytosis [D72.829] 12/14/2018   • Encephalopathy, toxic [G92] 12/14/2018   • Chronic systolic CHF (congestive heart failure) (CMS/[facility]) [I50.22] 12/13/2018   • Closed fracture of left hip (CMS/[facility]) [S72.002A] 12/12/2018   • DNR (do not resuscitate) [Z66] 12/12/2018   • Chronic a-fib (CMS/[facility]) [I48.2] 09/04/2016   • Ischemic cardiomyopathy [I25.5] 09/04/2016   • CAD (coronary artery disease) [I25.10] 09/04/2016   • Hyponatremia [E87.1] 09/04/2016      Resolved Hospital Problems   No resolved problems to display.   Left Hip fracture  - from mechanical fall  - s/p left hip IM nail 12/13/18  - pain control, mobility efforts  - appreciate orthopedic surgery care     CAD/ICM  - last known LVEF 25% in  9/2016  - currently no cardiac symptoms and no signs of CHF-check   - keep K+>/=4.0 and Mg>/=2.0  - telemetry reviewed, check postop ECG   - resume her lasix     Leukocytosis  - likely reactive from surgery, no e/o infection  - will follow up WBC in AM    Confusion  - likely toxic encephalopathy from anesthesia  - improved this AM, will monitor    DVT Prophylaxis  - SCDs    John Quiroz MD  [facility]  12/14/18  12:49 PM

## 2018-12-14 NOTE — PLAN OF CARE
Problem: Patient Care Overview  Goal: Plan of Care Review   12/14/18 7093   OTHER   Outcome Summary patient presents with generalized post op weakness, decreased activity tolerance, decreased balance which limit independence with functional mobility and patient would benefit from skilled PT. Patient lives at home with son, ambulates with rwx. Patient may benefit from SNU at d/c, will assess progress over next couple of days.

## 2018-12-15 PROBLEM — D62 POSTOPERATIVE ANEMIA DUE TO ACUTE BLOOD LOSS: Status: ACTIVE | Noted: 2018-12-15

## 2018-12-15 LAB
ANION GAP SERPL CALCULATED.3IONS-SCNC: 9.3 MMOL/L
BASOPHILS # BLD AUTO: 0 10*3/MM3 (ref 0–0.2)
BASOPHILS NFR BLD AUTO: 0 % (ref 0–1.5)
BUN BLD-MCNC: 15 MG/DL (ref 8–23)
BUN/CREAT SERPL: 20.5 (ref 7–25)
CALCIUM SPEC-SCNC: 8.5 MG/DL (ref 8.2–9.6)
CHLORIDE SERPL-SCNC: 94 MMOL/L (ref 98–107)
CO2 SERPL-SCNC: 26.7 MMOL/L (ref 22–29)
CREAT BLD-MCNC: 0.73 MG/DL (ref 0.57–1)
DEPRECATED RDW RBC AUTO: 50.4 FL (ref 37–54)
EOSINOPHIL # BLD AUTO: 0.01 10*3/MM3 (ref 0–0.7)
EOSINOPHIL NFR BLD AUTO: 0.1 % (ref 0.3–6.2)
ERYTHROCYTE [DISTWIDTH] IN BLOOD BY AUTOMATED COUNT: 15.2 % (ref 11.7–13)
GFR SERPL CREATININE-BSD FRML MDRD: 75 ML/MIN/1.73
GLUCOSE BLD-MCNC: 101 MG/DL (ref 65–99)
HCT VFR BLD AUTO: 28.5 % (ref 35.6–45.5)
HGB BLD-MCNC: 9.1 G/DL (ref 11.9–15.5)
IMM GRANULOCYTES # BLD: 0.03 10*3/MM3 (ref 0–0.03)
IMM GRANULOCYTES NFR BLD: 0.2 % (ref 0–0.5)
LYMPHOCYTES # BLD AUTO: 1.81 10*3/MM3 (ref 0.9–4.8)
LYMPHOCYTES NFR BLD AUTO: 13.9 % (ref 19.6–45.3)
MAGNESIUM SERPL-MCNC: 1.8 MG/DL (ref 1.7–2.3)
MCH RBC QN AUTO: 29.1 PG (ref 26.9–32)
MCHC RBC AUTO-ENTMCNC: 31.9 G/DL (ref 32.4–36.3)
MCV RBC AUTO: 91.1 FL (ref 80.5–98.2)
MONOCYTES # BLD AUTO: 1.14 10*3/MM3 (ref 0.2–1.2)
MONOCYTES NFR BLD AUTO: 8.7 % (ref 5–12)
NEUTROPHILS # BLD AUTO: 10.05 10*3/MM3 (ref 1.9–8.1)
NEUTROPHILS NFR BLD AUTO: 77.1 % (ref 42.7–76)
NRBC BLD MANUAL-RTO: 0 /100 WBC (ref 0–0)
PLATELET # BLD AUTO: 300 10*3/MM3 (ref 140–500)
PMV BLD AUTO: 8.5 FL (ref 6–12)
POTASSIUM BLD-SCNC: 4.1 MMOL/L (ref 3.5–5.2)
RBC # BLD AUTO: 3.13 10*6/MM3 (ref 3.9–5.2)
SODIUM BLD-SCNC: 130 MMOL/L (ref 136–145)
WBC NRBC COR # BLD: 13.04 10*3/MM3 (ref 4.5–10.7)

## 2018-12-15 PROCEDURE — 80048 BASIC METABOLIC PNL TOTAL CA: CPT | Performed by: INTERNAL MEDICINE

## 2018-12-15 PROCEDURE — 25010000002 MAGNESIUM SULFATE IN D5W 1G/100ML (PREMIX) 1-5 GM/100ML-% SOLUTION: Performed by: INTERNAL MEDICINE

## 2018-12-15 PROCEDURE — 85025 COMPLETE CBC W/AUTO DIFF WBC: CPT | Performed by: INTERNAL MEDICINE

## 2018-12-15 PROCEDURE — 97110 THERAPEUTIC EXERCISES: CPT

## 2018-12-15 PROCEDURE — 83735 ASSAY OF MAGNESIUM: CPT | Performed by: INTERNAL MEDICINE

## 2018-12-15 RX ORDER — MAGNESIUM SULFATE 1 G/100ML
1 INJECTION INTRAVENOUS ONCE
Status: COMPLETED | OUTPATIENT
Start: 2018-12-15 | End: 2018-12-15

## 2018-12-15 RX ORDER — SENNA AND DOCUSATE SODIUM 50; 8.6 MG/1; MG/1
2 TABLET, FILM COATED ORAL 2 TIMES DAILY
Status: DISCONTINUED | OUTPATIENT
Start: 2018-12-15 | End: 2018-12-20 | Stop reason: HOSPADM

## 2018-12-15 RX ADMIN — SODIUM CHLORIDE, PRESERVATIVE FREE 3 ML: 5 INJECTION INTRAVENOUS at 20:59

## 2018-12-15 RX ADMIN — APIXABAN 2.5 MG: 2.5 TABLET, FILM COATED ORAL at 20:59

## 2018-12-15 RX ADMIN — APIXABAN 2.5 MG: 2.5 TABLET, FILM COATED ORAL at 10:55

## 2018-12-15 RX ADMIN — SENNOSIDES AND DOCUSATE SODIUM 2 TABLET: 8.6; 5 TABLET ORAL at 18:21

## 2018-12-15 RX ADMIN — ATORVASTATIN CALCIUM 20 MG: 20 TABLET, FILM COATED ORAL at 20:58

## 2018-12-15 RX ADMIN — HYDROCODONE BITARTRATE AND ACETAMINOPHEN 1 TABLET: 5; 325 TABLET ORAL at 10:55

## 2018-12-15 RX ADMIN — MAGNESIUM SULFATE HEPTAHYDRATE 1 G: 1 INJECTION, SOLUTION INTRAVENOUS at 10:54

## 2018-12-15 RX ADMIN — SODIUM CHLORIDE, PRESERVATIVE FREE 3 ML: 5 INJECTION INTRAVENOUS at 10:55

## 2018-12-15 RX ADMIN — METOPROLOL SUCCINATE 25 MG: 25 TABLET, FILM COATED, EXTENDED RELEASE ORAL at 10:55

## 2018-12-15 RX ADMIN — ROPINIROLE 0.75 MG: 0.5 TABLET, FILM COATED ORAL at 20:59

## 2018-12-15 RX ADMIN — DOCUSATE SODIUM 100 MG: 100 CAPSULE, LIQUID FILLED ORAL at 10:54

## 2018-12-15 RX ADMIN — RANOLAZINE 1000 MG: 500 TABLET, FILM COATED, EXTENDED RELEASE ORAL at 20:58

## 2018-12-15 RX ADMIN — FUROSEMIDE 20 MG: 20 TABLET ORAL at 05:20

## 2018-12-15 RX ADMIN — PANTOPRAZOLE SODIUM 40 MG: 40 TABLET, DELAYED RELEASE ORAL at 18:21

## 2018-12-15 RX ADMIN — FUROSEMIDE 20 MG: 20 TABLET ORAL at 10:55

## 2018-12-15 RX ADMIN — RANOLAZINE 1000 MG: 500 TABLET, FILM COATED, EXTENDED RELEASE ORAL at 10:55

## 2018-12-15 RX ADMIN — PANTOPRAZOLE SODIUM 40 MG: 40 TABLET, DELAYED RELEASE ORAL at 05:20

## 2018-12-15 NOTE — PLAN OF CARE
Problem: Patient Care Overview  Goal: Plan of Care Review  Outcome: Ongoing (interventions implemented as appropriate)   12/15/18 1725   Coping/Psychosocial   Plan of Care Reviewed With patient   Plan of Care Review   Progress no change   OTHER   Outcome Summary pain pill x1 today, up to chair, son at bedside, sleeping most of day arousable, A flutter/fib      12/15/18 1725   Coping/Psychosocial   Plan of Care Reviewed With patient   Plan of Care Review   Progress no change   OTHER   Outcome Summary pain pill x1 today, up to chair, son at bedside, sleeping most of day arousable, A flutter/fib     Goal: Individualization and Mutuality  Outcome: Ongoing (interventions implemented as appropriate)    Goal: Discharge Needs Assessment  Outcome: Ongoing (interventions implemented as appropriate)    Goal: Interprofessional Rounds/Family Conf  Outcome: Ongoing (interventions implemented as appropriate)      Problem: Fall Risk (Adult)  Goal: Absence of Fall  Outcome: Ongoing (interventions implemented as appropriate)      Problem: Fracture Orthopaedic (Adult)  Goal: Signs and Symptoms of Listed Potential Problems Will be Absent, Minimized or Managed (Fracture Orthopaedic)  Outcome: Ongoing (interventions implemented as appropriate)      Problem: Skin Injury Risk (Adult)  Goal: Identify Related Risk Factors and Signs and Symptoms  Outcome: Ongoing (interventions implemented as appropriate)    Goal: Skin Health and Integrity  Outcome: Ongoing (interventions implemented as appropriate)      Problem: Pain, Acute (Adult)  Goal: Identify Related Risk Factors and Signs and Symptoms  Outcome: Ongoing (interventions implemented as appropriate)    Goal: Acceptable Pain Control/Comfort Level  Outcome: Ongoing (interventions implemented as appropriate)      Problem: Cardiac: Heart Failure (Adult)  Goal: Signs and Symptoms of Listed Potential Problems Will be Absent, Minimized or Managed (Cardiac: Heart Failure)  Outcome: Ongoing  (interventions implemented as appropriate)

## 2018-12-15 NOTE — THERAPY TREATMENT NOTE
Acute Care - Physical Therapy Treatment Note  [facility]     Patient Name: Araceli Mitchell  : 1926  MRN: [id number]  Today's Date: 12/15/2018  Onset of Illness/Injury or Date of Surgery: 18     Referring Physician: Gavin    Admit Date: 2018    Visit Dx:    ICD-10-CM ICD-9-CM   1. Closed fracture of left hip, initial encounter (CMS/[facility]) S72.002A 820.8   2. S/p left hip fracture Z87.81 V15.51   3. Fall, initial encounter W19.XXXA E888.9   4. Decreased mobility R26.89 781.99     Patient Active Problem List   Diagnosis   • NSTEMI (non-ST elevated myocardial infarction) (CMS/[facility])   • CAD (coronary artery disease)   • Ischemic cardiomyopathy   • Metabolic encephalopathy   • Chronic a-fib (CMS/HCC)   • Hyponatremia   • Hyperlipidemia   • Pulmonary HTN (CMS/[facility])   • GERD (gastroesophageal reflux disease)   • RLS (restless legs syndrome)   • Pulmonary fibrosis (CMS/HCC)   • Closed fracture of left hip (CMS/HCC)   • Closed left hip fracture, initial encounter (CMS/HCC)   • DNR (do not resuscitate)   • Chronic systolic CHF (congestive heart failure) (CMS/HCC)   • Underweight   • Leukocytosis   • Encephalopathy, toxic   • Postoperative anemia due to acute blood loss       Therapy Treatment    Rehabilitation Treatment Summary     Row Name 12/15/18 1400             Treatment Time/Intention    Discipline  physical therapist  -LS      Document Type  therapy note (daily note)  -LS      Subjective Information  complains of;fatigue  -LS      Mode of Treatment  physical therapy  -LS      Patient/Family Observations  Son at bedside  -LS      Total Minutes, Physical Therapy Treatment  25  -LS      Patient Effort  fair  -LS      Comment  Pt extremely lethargic throughout.  -LS      Recorded by [LS] Candice Munoz, PT 12/15/18 6930      Row Name 12/15/18 1400             Vital Signs    Pre Patient Position  Supine  -LS      Intra Patient Position  Standing  -LS      Post Patient Position  Sitting  -LS      Recorded by  [LS] Candice Munoz, PT 12/15/18 1454      Row Name 12/15/18 1400             Cognitive Assessment/Intervention- PT/OT    Orientation Status (Cognition)  disoriented to;situation;place;oriented to;person  -LS      Recorded by [LS] Candice Munoz, PT 12/15/18 1454      Row Name 12/15/18 1400             Bed Mobility Assessment/Treatment    Supine-Sit Lynn (Bed Mobility)  moderate assist (50% patient effort);2 person assist  -LS      Bed Mobility, Safety Issues  decreased use of legs for bridging/pushing;decreased use of arms for pushing/pulling  -LS      Recorded by [LS] Candice Munoz, PT 12/15/18 1454      Row Name 12/15/18 1400             Sit-Stand Transfer    Sit-Stand Lynn (Transfers)  minimum assist (75% patient effort);2 person assist  -LS      Assistive Device (Sit-Stand Transfers)  walker, front-wheeled  -LS      Recorded by [LS] Candice Munoz, PT 12/15/18 1454      Row Name 12/15/18 1400             Stand-Sit Transfer    Stand-Sit Lynn (Transfers)  minimum assist (75% patient effort);2 person assist  -LS      Assistive Device (Stand-Sit Transfers)  walker, front-wheeled  -LS      Recorded by [LS] Candice Munoz, PT 12/15/18 1454      Row Name 12/15/18 1400             Gait/Stairs Assessment/Training    Lynn Level (Gait)  minimum assist (75% patient effort);2 person assist;verbal cues;nonverbal cues (demo/gesture)  -LS      Assistive Device (Gait)  walker, front-wheeled  -LS      Distance in Feet (Gait)  10  -LS      Pattern (Gait)  step-to  -LS      Deviations/Abnormal Patterns (Gait)  base of support, narrow;ataxic;gait speed decreased;festinating/shuffling;stride length decreased  -LS      Bilateral Gait Deviations  forward flexed posture  -LS      Comment (Gait/Stairs)  Pt with inc shakiness as gait progressed. Episode of inc confusion and dec awareness with gait. Followed with chair. Nsg notified.   (Significant)   -LS      Recorded by [LS] Candice Munoz, PT 12/15/18  1454      Row Name 12/15/18 1400             Therapeutic Exercise    Comment (Therapeutic Exercise)  L hip protocol x 10 reps; B  -LS      Recorded by [LS] Candice Munoz, PT 12/15/18 1454      Row Name 12/15/18 1400             Positioning and Restraints    Pre-Treatment Position  in bed  -LS      Post Treatment Position  chair  -LS      In Chair  sitting;call light within reach;encouraged to call for assist;exit alarm on;with family/caregiver  -LS      Recorded by [LS] Candice Munoz, PT 12/15/18 1454      Row Name                Wound 12/13/18 1406 Left hip incision    Wound - Properties Group Date first assessed: 12/13/18 [LD] Time first assessed: 1406 [LD] Side: Left [LD] Location: hip [LD] Type: incision [LD] Recorded by:  [LD] Odalys Diaz RN 12/13/18 1406      User Key  (r) = Recorded By, (t) = Taken By, (c) = Cosigned By    Initials Name Effective Dates Discipline    LD Odalys Diaz, RN 06/16/16 -  Nurse    Candice Hale, PT 04/03/18 -  PT          Wound 12/13/18 1406 Left hip incision (Active)   Dressing Appearance dry;intact;dried drainage 12/15/2018 12:00 AM   Closure [place] 12/15/2018 12:00 AM   Drainage Amount none 12/15/2018 12:00 AM   Dressing Care, Wound low-adherent 12/14/2018  8:20 PM           Physical Therapy Education     Title: PT OT SLP Therapies (Done)     Topic: Physical Therapy (Done)     Point: Mobility training (Done)     Learning Progress Summary           Patient Acceptance, E,TB, VU,DU by LOU at 12/15/2018  2:56 PM    Acceptance, E, NR by EM at 12/14/2018  1:57 PM                   Point: Home exercise program (Done)     Learning Progress Summary           Patient Acceptance, E,TB, VU,DU by LOU at 12/15/2018  2:56 PM    Acceptance, E, NR by EM at 12/14/2018  1:57 PM                   Point: Body mechanics (Done)     Learning Progress Summary           Patient Acceptance, E,TB, VU,DU by LS at 12/15/2018  2:56 PM                   Point: Precautions (Done)     Learning Progress  Summary           Patient Acceptance, E,TB, VU,DU by  at 12/15/2018  2:56 PM                               User Key     Initials Effective Dates Name Provider Type Discipline     04/03/18 -  Thania Rojo, PT Physical Therapist PT     04/03/18 -  Candice Munoz, NINA Physical Therapist PT                PT Recommendation and Plan  Anticipated Discharge Disposition (PT): skilled nursing facility  Outcome Summary/Treatment Plan (PT)  Anticipated Discharge Disposition (PT): skilled nursing facility  Plan of Care Reviewed With: patient  Outcome Summary: Pt extremely lethargic, difficulty waking today. Able to keep eyes open for ambulation. She ambulated x 10 ft with chair following due to BLE shakiness and lethargy. Pt sat in chair and was unable to maintain upright seated position, falling asleep to L with dec responses to questions initially. Nsg notified due to brief dec alertness and inc disorientation. Family member present and chair alarm on with pillows surrounding and legs elevated to support pt.   Outcome Measures     Row Name 12/15/18 1400 12/14/18 1300          How much help from another person do you currently need...    Turning from your back to your side while in flat bed without using bedrails?  2  -LS  3  -EM     Moving from lying on back to sitting on the side of a flat bed without bedrails?  2  -LS  3  -EM     Moving to and from a bed to a chair (including a wheelchair)?  2  -LS  3  -EM     Standing up from a chair using your arms (e.g., wheelchair, bedside chair)?  2  -LS  3  -EM     Climbing 3-5 steps with a railing?  1  -LS  2  -EM     To walk in hospital room?  2  -LS  3  -EM     AM-PAC 6 Clicks Score  11  -LS  17  -EM        Functional Assessment    Outcome Measure Options  AM-PAC 6 Clicks Basic Mobility (PT)  -LS  AM-PAC 6 Clicks Basic Mobility (PT)  -EM       User Key  (r) = Recorded By, (t) = Taken By, (c) = Cosigned By    Initials Name Provider Type    EM Thania Rojo, PT  Physical Therapist    LS Candice Munoz, PT Physical Therapist         Time Calculation:   PT Charges     Row Name 12/15/18 1456             Time Calculation    Start Time  1430  -LS      Stop Time  1455  -LS      Time Calculation (min)  25 min  -LS         Time Calculation- PT    Total Timed Code Minutes- PT  23 minute(s)  -LS        User Key  (r) = Recorded By, (t) = Taken By, (c) = Cosigned By    Initials Name Provider Type    LS Candice Munoz, NINA Physical Therapist        Therapy Suggested Charges     Code   Minutes Charges    None           Therapy Charges for Today     Code Description Service Date Service Provider Modifiers Qty    42704622471 HC PT THER PROC EA 15 MIN 12/15/2018 Candice Munoz, PT GP 2    96836854663 HC PT THER SUPP EA 15 MIN 12/15/2018 Candice Munoz, PT GP 1          PT G-Codes  Outcome Measure Options: AM-PAC 6 Clicks Basic Mobility (PT)  AM-PAC 6 Clicks Score: 11    Candice Munoz PT  12/15/2018

## 2018-12-15 NOTE — PROGRESS NOTES
Name: Araceli Mitchell ADMIT: 2018   : 1926  PCP: Provider, No Known    MRN: [id number] LOS: 3 days   AGE/SEX: 89 y.o. female  ROOM: [facility]/   Subjective   CC: left hip pain  No acute events.  Pain is reasonably controlled.  Taking PO well. No CP/dyspnea/orthopnea/f/c/n/v/d.      Objective   Vital Signs  Temp:  [97.4 °F (36.3 °C)-97.9 °F (36.6 °C)] 97.6 °F (36.4 °C)  Heart Rate:  [58-70] 58  Resp:  [16] 16  BP: (104-141)/(57-88) 137/67  SpO2:  [91 %-96 %] 96 %  on   ;   Device (Oxygen Therapy): room air  Body mass index is 21.53 kg/m².    Physical Exam   Constitutional: She is oriented to person, place, and time. No distress.   HENT:   Head: Normocephalic and atraumatic.   Mouth/Throat: Oropharynx is clear and moist.   Eyes: Conjunctivae and EOM are normal. Pupils are equal, round, and reactive to light.   Neck: Normal range of motion. Neck supple.   Cardiovascular: Normal rate, regular rhythm and intact distal pulses.   Pulmonary/Chest: Effort normal and breath sounds normal. She has no rales.   Abdominal: Soft. Bowel sounds are normal.   Musculoskeletal: She exhibits no edema or tenderness.   LLE with surgical dressings c/d/i; neurovascularly intact   Neurological: She is alert and oriented to person, place, and time.   Skin: Skin is warm and dry. She is not diaphoretic.   Psychiatric: She has a normal mood and affect. Her behavior is normal.   Nursing note and vitals reviewed.      Results Review:       I reviewed the patient's new clinical results.  Results from last 7 days   Lab Units  12/15/18   0518   0450  18   1113   WBC 10*3/mm3  13.04*  20.41*  10.28  11.23*   HEMOGLOBIN g/dL  9.1*  11.0*  10.4*  13.5   PLATELETS 10*3/mm3  300  366  342  392     Results from last 7 days   Lab Units  12/15/18   0517  18   0527  18   0450  18   1630   SODIUM mmol/L  130*  131*  134*  132*   POTASSIUM mmol/L  4.1  4.1  4.0  3.8   CHLORIDE mmol/L  94*  93*  97*   94*   CO2 mmol/L  26.7  23.0  25.6  25.4   BUN mg/dL  15  7*  8  7*   CREATININE mg/dL  0.73  0.72  0.52*  0.62   GLUCOSE mg/dL  101*  118*  96  124*   Estimated Creatinine Clearance: 35.4 mL/min (by C-G formula based on SCr of 0.73 mg/dL).  Results from last 7 days   Lab Units  12/12/18   1630   ALBUMIN g/dL  3.00*   BILIRUBIN mg/dL  0.6   ALK PHOS U/L  67   AST (SGOT) U/L  17   ALT (SGPT) U/L  10     Results from last 7 days   Lab Units  12/15/18   0517  12/14/18   0527  12/13/18   0450  12/12/18   1630   CALCIUM mg/dL  8.5  8.9  8.3  8.8   ALBUMIN g/dL   --    --    --   3.00*   MAGNESIUM mg/dL  1.8  2.0   --   2.1           apixaban 2.5 mg Oral Q12H   atorvastatin 20 mg Oral Nightly   docusate sodium 100 mg Oral BID   furosemide 20 mg Oral QAM   metoprolol succinate XL 25 mg Oral Daily   pantoprazole 40 mg Oral BID AC   ranolazine 1,000 mg Oral Q12H   rOPINIRole 0.75 mg Oral Nightly   sodium chloride 3 mL Intravenous Q12H      Diet Regular      Assessment/Plan      Active Hospital Problems    Diagnosis Date Noted   • **Closed left hip fracture, initial encounter (CMS/[facility]) [S72.002A] 12/12/2018   • Underweight [R63.6] 12/14/2018   • Leukocytosis [D72.829] 12/14/2018   • Encephalopathy, toxic [G92] 12/14/2018   • Chronic systolic CHF (congestive heart failure) (CMS/[facility]) [I50.22] 12/13/2018   • Closed fracture of left hip (CMS/[facility]) [S72.002A] 12/12/2018   • DNR (do not resuscitate) [Z66] 12/12/2018   • Chronic a-fib (CMS/[facility]) [I48.2] 09/04/2016   • Ischemic cardiomyopathy [I25.5] 09/04/2016   • CAD (coronary artery disease) [I25.10] 09/04/2016   • Hyponatremia [E87.1] 09/04/2016      Resolved Hospital Problems   No resolved problems to display.   Left Hip fracture  - from mechanical fall  - s/p left hip IM nail 12/13/18  - pain control, mobility efforts  - incentive spirometry  - appreciate orthopedic surgery care     CAD/ICM/CHF  - last known LVEF 25% in 9/2016  - currently no cardiac symptoms and no signs of  CHF-check   - keep K+>/=4.0 and Mg>/=2.0-give 1g MgSO4 today  - telemetry reviewed, postop ECG showing no significant changes  - continue lasix, metoprolol, lipitor    Leukocytosis  - likely reactive from surgery, no e/o infection  - improving without specific treatment    Confusion  - likely toxic encephalopathy from anesthesia  - iresolved, will monitor    DVT Prophylaxis  - cheri Quiroz MD  [facility]  12/15/18  11:06 AM

## 2018-12-15 NOTE — PLAN OF CARE
Problem: Patient Care Overview  Goal: Plan of Care Review   12/15/18 0334   Coping/Psychosocial   Plan of Care Reviewed With patient   Plan of Care Review   Progress improving   OTHER   Outcome Summary VSS, no complaints of pain, patient very drowsy, had no sleep the night before, alert and oriented except to time, up with assist and walker, incision has old dried drainage, will continue to monitor.       Problem: Fall Risk (Adult)  Goal: Absence of Fall  Outcome: Ongoing (interventions implemented as appropriate)

## 2018-12-15 NOTE — PLAN OF CARE
Problem: Patient Care Overview  Goal: Plan of Care Review  Outcome: Ongoing (interventions implemented as appropriate)   12/15/18 5888   Coping/Psychosocial   Plan of Care Reviewed With patient   OTHER   Outcome Summary Pt extremely lethargic, difficulty waking today. Able to keep eyes open for ambulation. She ambulated x 10 ft with chair following due to BLE shakiness and lethargy. Pt sat in chair and was unable to maintain upright seated position, falling asleep to L with dec responses to questions initially. Nsg notified due to brief dec alertness and inc disorientation. Family member present and chair alarm on with pillows surrounding and legs elevated to support pt.

## 2018-12-16 PROBLEM — K59.00 CONSTIPATION: Status: ACTIVE | Noted: 2018-12-16

## 2018-12-16 LAB
ANION GAP SERPL CALCULATED.3IONS-SCNC: 13 MMOL/L
BASOPHILS # BLD AUTO: 0 10*3/MM3 (ref 0–0.2)
BASOPHILS NFR BLD AUTO: 0 % (ref 0–1.5)
BUN BLD-MCNC: 18 MG/DL (ref 8–23)
BUN/CREAT SERPL: 27.7 (ref 7–25)
CALCIUM SPEC-SCNC: 8.3 MG/DL (ref 8.2–9.6)
CHLORIDE SERPL-SCNC: 91 MMOL/L (ref 98–107)
CO2 SERPL-SCNC: 24 MMOL/L (ref 22–29)
CREAT BLD-MCNC: 0.65 MG/DL (ref 0.57–1)
DEPRECATED RDW RBC AUTO: 48.7 FL (ref 37–54)
EOSINOPHIL # BLD AUTO: 0.03 10*3/MM3 (ref 0–0.7)
EOSINOPHIL NFR BLD AUTO: 0.2 % (ref 0.3–6.2)
ERYTHROCYTE [DISTWIDTH] IN BLOOD BY AUTOMATED COUNT: 15.1 % (ref 11.7–13)
GFR SERPL CREATININE-BSD FRML MDRD: 85 ML/MIN/1.73
GLUCOSE BLD-MCNC: 89 MG/DL (ref 65–99)
HCT VFR BLD AUTO: 28 % (ref 35.6–45.5)
HGB BLD-MCNC: 9.4 G/DL (ref 11.9–15.5)
IMM GRANULOCYTES # BLD: 0.04 10*3/MM3 (ref 0–0.03)
IMM GRANULOCYTES NFR BLD: 0.3 % (ref 0–0.5)
LYMPHOCYTES # BLD AUTO: 1.78 10*3/MM3 (ref 0.9–4.8)
LYMPHOCYTES NFR BLD AUTO: 14.7 % (ref 19.6–45.3)
MCH RBC QN AUTO: 29.4 PG (ref 26.9–32)
MCHC RBC AUTO-ENTMCNC: 33.6 G/DL (ref 32.4–36.3)
MCV RBC AUTO: 87.5 FL (ref 80.5–98.2)
MONOCYTES # BLD AUTO: 0.94 10*3/MM3 (ref 0.2–1.2)
MONOCYTES NFR BLD AUTO: 7.8 % (ref 5–12)
NEUTROPHILS # BLD AUTO: 9.34 10*3/MM3 (ref 1.9–8.1)
NEUTROPHILS NFR BLD AUTO: 77.3 % (ref 42.7–76)
NRBC BLD MANUAL-RTO: 0 /100 WBC (ref 0–0)
PLATELET # BLD AUTO: 336 10*3/MM3 (ref 140–500)
PMV BLD AUTO: 9 FL (ref 6–12)
POTASSIUM BLD-SCNC: 4 MMOL/L (ref 3.5–5.2)
RBC # BLD AUTO: 3.2 10*6/MM3 (ref 3.9–5.2)
SODIUM BLD-SCNC: 128 MMOL/L (ref 136–145)
WBC NRBC COR # BLD: 12.09 10*3/MM3 (ref 4.5–10.7)

## 2018-12-16 PROCEDURE — 36415 COLL VENOUS BLD VENIPUNCTURE: CPT | Performed by: INTERNAL MEDICINE

## 2018-12-16 PROCEDURE — 80048 BASIC METABOLIC PNL TOTAL CA: CPT | Performed by: INTERNAL MEDICINE

## 2018-12-16 PROCEDURE — 97110 THERAPEUTIC EXERCISES: CPT

## 2018-12-16 PROCEDURE — 85025 COMPLETE CBC W/AUTO DIFF WBC: CPT | Performed by: INTERNAL MEDICINE

## 2018-12-16 RX ORDER — BISACODYL 10 MG
10 SUPPOSITORY, RECTAL RECTAL ONCE
Status: COMPLETED | OUTPATIENT
Start: 2018-12-16 | End: 2018-12-16

## 2018-12-16 RX ADMIN — PANTOPRAZOLE SODIUM 40 MG: 40 TABLET, DELAYED RELEASE ORAL at 18:31

## 2018-12-16 RX ADMIN — SODIUM CHLORIDE, PRESERVATIVE FREE 3 ML: 5 INJECTION INTRAVENOUS at 09:45

## 2018-12-16 RX ADMIN — BISACODYL 10 MG: 10 SUPPOSITORY RECTAL at 18:31

## 2018-12-16 RX ADMIN — METOPROLOL SUCCINATE 25 MG: 25 TABLET, FILM COATED, EXTENDED RELEASE ORAL at 09:41

## 2018-12-16 RX ADMIN — SODIUM CHLORIDE, PRESERVATIVE FREE 3 ML: 5 INJECTION INTRAVENOUS at 21:23

## 2018-12-16 RX ADMIN — RANOLAZINE 1000 MG: 500 TABLET, FILM COATED, EXTENDED RELEASE ORAL at 09:41

## 2018-12-16 RX ADMIN — APIXABAN 2.5 MG: 2.5 TABLET, FILM COATED ORAL at 21:22

## 2018-12-16 RX ADMIN — SENNOSIDES AND DOCUSATE SODIUM 2 TABLET: 8.6; 5 TABLET ORAL at 09:41

## 2018-12-16 RX ADMIN — ATORVASTATIN CALCIUM 20 MG: 20 TABLET, FILM COATED ORAL at 21:22

## 2018-12-16 RX ADMIN — FUROSEMIDE 20 MG: 20 TABLET ORAL at 06:59

## 2018-12-16 RX ADMIN — HYDROCODONE BITARTRATE AND ACETAMINOPHEN 1 TABLET: 5; 325 TABLET ORAL at 02:03

## 2018-12-16 RX ADMIN — APIXABAN 2.5 MG: 2.5 TABLET, FILM COATED ORAL at 09:42

## 2018-12-16 RX ADMIN — ROPINIROLE 0.75 MG: 0.5 TABLET, FILM COATED ORAL at 21:22

## 2018-12-16 RX ADMIN — RANOLAZINE 1000 MG: 500 TABLET, FILM COATED, EXTENDED RELEASE ORAL at 21:22

## 2018-12-16 RX ADMIN — SENNOSIDES AND DOCUSATE SODIUM 2 TABLET: 8.6; 5 TABLET ORAL at 21:22

## 2018-12-16 RX ADMIN — PANTOPRAZOLE SODIUM 40 MG: 40 TABLET, DELAYED RELEASE ORAL at 07:01

## 2018-12-16 NOTE — PLAN OF CARE
Problem: Patient Care Overview  Goal: Plan of Care Review  Outcome: Ongoing (interventions implemented as appropriate)   12/16/18 0313   Coping/Psychosocial   Plan of Care Reviewed With patient   OTHER   Outcome Summary Patient resting at intervals. Not in an distress. Medicated for left hip pain with good results. Patient  refused to be turned at times. Fall precautions enforced. Monitored.     Goal: Discharge Needs Assessment  Outcome: Ongoing (interventions implemented as appropriate)      Problem: Fall Risk (Adult)  Goal: Absence of Fall  Outcome: Ongoing (interventions implemented as appropriate)      Problem: Fracture Orthopaedic (Adult)  Goal: Signs and Symptoms of Listed Potential Problems Will be Absent, Minimized or Managed (Fracture Orthopaedic)  Outcome: Ongoing (interventions implemented as appropriate)      Problem: Skin Injury Risk (Adult)  Goal: Skin Health and Integrity  Outcome: Ongoing (interventions implemented as appropriate)      Problem: Pain, Acute (Adult)  Goal: Identify Related Risk Factors and Signs and Symptoms  Outcome: Ongoing (interventions implemented as appropriate)    Goal: Acceptable Pain Control/Comfort Level  Outcome: Ongoing (interventions implemented as appropriate)      Problem: Cardiac: Heart Failure (Adult)  Goal: Signs and Symptoms of Listed Potential Problems Will be Absent, Minimized or Managed (Cardiac: Heart Failure)  Outcome: Ongoing (interventions implemented as appropriate)

## 2018-12-16 NOTE — THERAPY TREATMENT NOTE
Acute Care - Physical Therapy Treatment Note  [facility]     Patient Name: Araceli Mitchell  : 1926  MRN: [id number]  Today's Date: 2018  Onset of Illness/Injury or Date of Surgery: 18     Referring Physician: Gavin    Admit Date: 2018    Visit Dx:    ICD-10-CM ICD-9-CM   1. Closed fracture of left hip, initial encounter (CMS/[facility]) S72.002A 820.8   2. S/p left hip fracture Z87.81 V15.51   3. Fall, initial encounter W19.XXXA E888.9   4. Decreased mobility R26.89 781.99     Patient Active Problem List   Diagnosis   • NSTEMI (non-ST elevated myocardial infarction) (CMS/[facility])   • CAD (coronary artery disease)   • Ischemic cardiomyopathy   • Metabolic encephalopathy   • Chronic a-fib (CMS/HCC)   • Hyponatremia   • Hyperlipidemia   • Pulmonary HTN (CMS/HCC)   • GERD (gastroesophageal reflux disease)   • RLS (restless legs syndrome)   • Pulmonary fibrosis (CMS/HCC)   • Closed fracture of left hip (CMS/HCC)   • Closed left hip fracture, initial encounter (CMS/HCC)   • DNR (do not resuscitate)   • Chronic systolic CHF (congestive heart failure) (CMS/HCC)   • Underweight   • Leukocytosis   • Encephalopathy, toxic   • Postoperative anemia due to acute blood loss       Therapy Treatment    Rehabilitation Treatment Summary     Row Name 18 1400             Treatment Time/Intention    Discipline  physical therapist  -LS      Document Type  therapy note (daily note)  -LS      Subjective Information  no complaints  -LS      Mode of Treatment  physical therapy  -LS      Patient/Family Observations  Son at bedside.  -LS      Total Minutes, Physical Therapy Treatment  15  -LS      Patient Effort  good  -LS      Comment  Much more alert today.  -LS      Recorded by [LS] Candice Munoz, PT 18 1224      Row Name 18 1400             Cognitive Assessment/Intervention- PT/OT    Orientation Status (Cognition)  oriented x 4  -LS      Follows Commands (Cognition)  WFL  -LS      Safety Deficit (Cognitive)   safety precautions awareness  -LS      Recorded by [LS] Candice Munoz, PT 12/16/18 1408      Row Name 12/16/18 1400             Bed Mobility Assessment/Treatment    Supine-Sit [place] (Bed Mobility)  moderate assist (50% patient effort)  -LS      Bed Mobility, Safety Issues  decreased use of legs for bridging/pushing;decreased use of arms for pushing/pulling  -LS      Recorded by [LS] Candice Munoz, PT 12/16/18 1408      Row Name 12/16/18 1400             Sit-Stand Transfer    Sit-Stand [place] (Transfers)  minimum assist (75% patient effort)  -LS      Assistive Device (Sit-Stand Transfers)  walker, front-wheeled  -LS      Recorded by [LS] Candice Munoz, PT 12/16/18 1408      Row Name 12/16/18 1400             Stand-Sit Transfer    Stand-Sit [place] (Transfers)  minimum assist (75% patient effort)  -LS      Assistive Device (Stand-Sit Transfers)  walker, front-wheeled  -LS      Recorded by [LS] Candice Munoz, PT 12/16/18 1408      Row Name 12/16/18 1400             Gait/Stairs Assessment/Training    [place] Level (Gait)  minimum assist (75% patient effort)  -LS      Assistive Device (Gait)  walker, front-wheeled  -LS      Distance in Feet (Gait)  10  -LS      Pattern (Gait)  step-to  -LS      Deviations/Abnormal Patterns (Gait)  ataxic;antalgic;base of support, narrow;festinating/shuffling;gait speed decreased;stride length decreased  -LS      Bilateral Gait Deviations  forward flexed posture;heel strike decreased  -LS      Comment (Gait/Stairs)  Unsteady on feet, quick fatigue.  -LS      Recorded by [LS] Candice Munoz, PT 12/16/18 1408      Row Name 12/16/18 1400             Therapeutic Exercise    Comment (Therapeutic Exercise)  15 reps per hip protocol   -LS      Recorded by [LS] Candice Munoz, PT 12/16/18 1408      Row Name 12/16/18 1400             Static Sitting Balance    Level of [place] (Unsupported Sitting, Static Balance)  independent  -LS      Recorded by [LS] Alexander  Candice, PT 12/16/18 1408      Row Name 12/16/18 1400             Static Standing Balance    Level of [place] (Supported Standing, Static Balance)  contact guard assist  -LS      Recorded by [LS] Candice Munoz, PT 12/16/18 1408      Row Name 12/16/18 1400             Positioning and Restraints    Pre-Treatment Position  in bed  -LS      Post Treatment Position  chair  -LS      In Chair  sitting;encouraged to call for assist;call light within reach;with family/caregiver  -LS      Recorded by [LS] Candice Munoz, PT 12/16/18 1408      Row Name 12/16/18 1400             Pain Scale: Numbers Pre/Post-Treatment    Pain Location - Side  Left  -LS      Pain Location  hip  -LS      Pain Intervention(s)  Repositioned;Ambulation/increased activity  -LS      Recorded by [LS] Candice Munoz, PT 12/16/18 1408      Row Name                Wound 12/13/18 1406 Left hip incision    Wound - Properties Group Date first assessed: 12/13/18 [LD] Time first assessed: 1406 [LD] Side: Left [LD] Location: hip [LD] Type: incision [LD] Recorded by:  [LD] Odalys Diaz RN 12/13/18 1406      User Key  (r) = Recorded By, (t) = Taken By, (c) = Cosigned By    Initials Name Effective Dates Discipline    LD Odalys Diaz, RN 06/16/16 -  Nurse    Candice Hale, PT 04/03/18 -  PT          Wound 12/13/18 1406 Left hip incision (Active)   Dressing Appearance dry;intact 12/16/2018  9:47 AM   Closure [place] 12/16/2018  9:47 AM   Drainage Amount none 12/16/2018  9:47 AM   Dressing Care, Wound low-adherent 12/16/2018  9:47 AM           Physical Therapy Education     Title: PT OT SLP Therapies (Done)     Topic: Physical Therapy (Done)     Point: Mobility training (Done)     Learning Progress Summary           Patient Acceptance, E,TB, VU,DU by LOU at 12/16/2018  2:09 PM    Acceptance, E,TB, VU,DU by LOU at 12/15/2018  2:56 PM    Acceptance, E, NR by EM at 12/14/2018  1:57 PM                   Point: Home exercise program (Done)     Learning Progress  Summary           Patient Acceptance, E,TB, VU,DU by LS at 12/16/2018  2:09 PM    Acceptance, E,TB, VU,DU by LS at 12/15/2018  2:56 PM    Acceptance, E, NR by EM at 12/14/2018  1:57 PM                   Point: Body mechanics (Done)     Learning Progress Summary           Patient Acceptance, E,TB, VU,DU by LS at 12/16/2018  2:09 PM    Acceptance, E,TB, VU,DU by LS at 12/15/2018  2:56 PM                   Point: Precautions (Done)     Learning Progress Summary           Patient Acceptance, E,TB, VU,DU by LS at 12/16/2018  2:09 PM    Acceptance, E,TB, VU,DU by LS at 12/15/2018  2:56 PM                               User Key     Initials Effective Dates Name Provider Type Discipline    EM 04/03/18 -  Thania Rojo, PT Physical Therapist PT     04/03/18 -  Candice Munoz, PT Physical Therapist PT                PT Recommendation and Plan  Anticipated Discharge Disposition (PT): skilled nursing facility  Outcome Summary/Treatment Plan (PT)  Anticipated Discharge Disposition (PT): skilled nursing facility  Plan of Care Reviewed With: patient  Outcome Summary: Pt with much improved alertness and orientation today with no trouble staying awake for PT treatment. Short distance of ambulation and then returned to chair to sit. Improved use of RW but generalized weakness in UE resulted in inc WBIng on LLE which inc pt's pain. Slow progress towards goals but demonstrating improvement.   Outcome Measures     Row Name 12/16/18 1400 12/15/18 1400 12/14/18 1300       How much help from another person do you currently need...    Turning from your back to your side while in flat bed without using bedrails?  3  -LS  2  -LS  3  -EM    Moving from lying on back to sitting on the side of a flat bed without bedrails?  2  -LS  2  -LS  3  -EM    Moving to and from a bed to a chair (including a wheelchair)?  3  -LS  2  -LS  3  -EM    Standing up from a chair using your arms (e.g., wheelchair, bedside chair)?  3  -LS  2  -LS  3  -EM     Climbing 3-5 steps with a railing?  1  -LS  1  -LS  2  -EM    To walk in hospital room?  3  -LS  2  -LS  3  -EM    AM-PAC 6 Clicks Score  15  -LS  11  -LS  17  -EM       Functional Assessment    Outcome Measure Options  AM-PAC 6 Clicks Basic Mobility (PT)  -LS  AM-PAC 6 Clicks Basic Mobility (PT)  -LS  AM-PAC 6 Clicks Basic Mobility (PT)  -EM      User Key  (r) = Recorded By, (t) = Taken By, (c) = Cosigned By    Initials Name Provider Type    EM Thania Rojo, PT Physical Therapist    LS Candice Munoz, PT Physical Therapist         Time Calculation:   PT Charges     Row Name 12/16/18 1410             Time Calculation    Start Time  1330  -LS      Stop Time  1345  -      Time Calculation (min)  15 min  -      PT Received On  12/16/18  -      PT - Next Appointment  12/17/18  -         Time Calculation- PT    Total Timed Code Minutes- PT  15 minute(s)  -        User Key  (r) = Recorded By, (t) = Taken By, (c) = Cosigned By    Initials Name Provider Type    LS Candice Munoz, PT Physical Therapist        Therapy Suggested Charges     Code   Minutes Charges    None           Therapy Charges for Today     Code Description Service Date Service Provider Modifiers Qty    76920651527 HC PT THER PROC EA 15 MIN 12/15/2018 Candice Munoz, PT GP 2    53546638675 HC PT THER SUPP EA 15 MIN 12/15/2018 Candice Munoz, PT GP 1    91423467656 HC PT THER PROC EA 15 MIN 12/16/2018 Candice Munoz, PT GP 1          PT G-Codes  Outcome Measure Options: AM-PAC 6 Clicks Basic Mobility (PT)  AM-PAC 6 Clicks Score: 15    Candice Munoz PT  12/16/2018

## 2018-12-16 NOTE — PLAN OF CARE
Problem: Patient Care Overview  Goal: Plan of Care Review  Outcome: Ongoing (interventions implemented as appropriate)   12/16/18 3221   Coping/Psychosocial   Plan of Care Reviewed With patient   OTHER   Outcome Summary Pt with much improved alertness and orientation today with no trouble staying awake for PT treatment. Short distance of ambulation and then returned to chair to sit. Improved use of RW but generalized weakness in UE resulted in inc WBIng on LLE which inc pt's pain. Slow progress towards goals but demonstrating improvement.

## 2018-12-16 NOTE — PLAN OF CARE
Problem: Patient Care Overview  Goal: Plan of Care Review  Outcome: Ongoing (interventions implemented as appropriate)   12/16/18 8708   Coping/Psychosocial   Plan of Care Reviewed With patient   Plan of Care Review   Progress no change   OTHER   Outcome Summary no c/o pain, up to chair today, dressing change, suppository constipation       Problem: Fracture Orthopaedic (Adult)  Goal: Signs and Symptoms of Listed Potential Problems Will be Absent, Minimized or Managed (Fracture Orthopaedic)  Outcome: Ongoing (interventions implemented as appropriate)      Problem: Skin Injury Risk (Adult)  Goal: Identify Related Risk Factors and Signs and Symptoms  Outcome: Ongoing (interventions implemented as appropriate)      Problem: Pain, Acute (Adult)  Goal: Identify Related Risk Factors and Signs and Symptoms  Outcome: Ongoing (interventions implemented as appropriate)      Problem: Cardiac: Heart Failure (Adult)  Goal: Signs and Symptoms of Listed Potential Problems Will be Absent, Minimized or Managed (Cardiac: Heart Failure)  Outcome: Ongoing (interventions implemented as appropriate)

## 2018-12-16 NOTE — PROGRESS NOTES
Name: Araceli Mitchell ADMIT: 2018   : 1926  PCP: Provider, No Known    MRN: [id number] LOS: 4 days   AGE/SEX: 89 y.o. female  ROOM: [facility]/   Subjective   CC: left hip pain  No acute events.  Pain is well- controlled.  Not taking much PO, states her appetite has never been great. No CP/dyspnea/orthopnea/f/c/n/v/d.      Objective   Vital Signs  Temp:  [97.2 °F (36.2 °C)-97.7 °F (36.5 °C)] 97.7 °F (36.5 °C)  Heart Rate:  [54-73] 69  Resp:  [16] 16  BP: (110-137)/(60-74) 137/71  SpO2:  [92 %-96 %] 94 %  on   ;   Device (Oxygen Therapy): room air  Body mass index is 22.22 kg/m².    Physical Exam   Constitutional: She is oriented to person, place, and time. No distress.   HENT:   Head: Normocephalic and atraumatic.   Mouth/Throat: Oropharynx is clear and moist.   Eyes: Conjunctivae and EOM are normal. Pupils are equal, round, and reactive to light.   Neck: Normal range of motion. Neck supple.   Cardiovascular: Normal rate, regular rhythm and intact distal pulses.   Pulmonary/Chest: Effort normal and breath sounds normal. She has no rales.   Abdominal: Soft. Bowel sounds are normal.   Musculoskeletal: She exhibits no edema or tenderness.   LLE with surgical dressings c/d/i; neurovascularly intact   Neurological: She is alert and oriented to person, place, and time.   Skin: Skin is warm and dry. She is not diaphoretic.   Psychiatric: She has a normal mood and affect. Her behavior is normal.   Nursing note and vitals reviewed.      Results Review:       I reviewed the patient's new clinical results.  Results from last 7 days   Lab Units  18   0713  12/15/18   0517  18   0527  18   0450   WBC 10*3/mm3  12.09*  13.04*  20.41*  10.28   HEMOGLOBIN g/dL  9.4*  9.1*  11.0*  10.4*   PLATELETS 10*3/mm3  336  300  366  342     Results from last 7 days   Lab Units  18   0713  12/15/18   0517  18   0527  18   0450   SODIUM mmol/L  128*  130*  131*  134*   POTASSIUM mmol/L  4.0  4.1   4.1  4.0   CHLORIDE mmol/L  91*  94*  93*  97*   CO2 mmol/L  24.0  26.7  23.0  25.6   BUN mg/dL  18  15  7*  8   CREATININE mg/dL  0.65  0.73  0.72  0.52*   GLUCOSE mg/dL  89  101*  118*  96   Estimated Creatinine Clearance: 36.6 mL/min (by C-G formula based on SCr of 0.65 mg/dL).  Results from last 7 days   Lab Units  12/12/18   1630   ALBUMIN g/dL  3.00*   BILIRUBIN mg/dL  0.6   ALK PHOS U/L  67   AST (SGOT) U/L  17   ALT (SGPT) U/L  10     Results from last 7 days   Lab Units  12/16/18   0713  12/15/18   0517  12/14/18   0527  12/13/18   0450  12/12/18   1630   CALCIUM mg/dL  8.3  8.5  8.9  8.3  8.8   ALBUMIN g/dL   --    --    --    --   3.00*   MAGNESIUM mg/dL   --   1.8  2.0   --   2.1           apixaban 2.5 mg Oral Q12H   atorvastatin 20 mg Oral Nightly   bisacodyl 10 mg Rectal Once   furosemide 20 mg Oral QAM   metoprolol succinate XL 25 mg Oral Daily   pantoprazole 40 mg Oral BID AC   ranolazine 1,000 mg Oral Q12H   rOPINIRole 0.75 mg Oral Nightly   sennosides-docusate sodium 2 tablet Oral BID   sodium chloride 3 mL Intravenous Q12H      Diet Regular      Assessment/Plan      Active Hospital Problems    Diagnosis Date Noted   • **Closed left hip fracture, initial encounter (CMS/[facility]) [S72.002A] 12/12/2018   • Constipation [K59.00] 12/16/2018   • Postoperative anemia due to acute blood loss [D62] 12/15/2018   • Underweight [R63.6] 12/14/2018   • Leukocytosis [D72.829] 12/14/2018   • Encephalopathy, toxic [G92] 12/14/2018   • Chronic systolic CHF (congestive heart failure) (CMS/[facility]) [I50.22] 12/13/2018   • Closed fracture of left hip (CMS/[facility]) [S72.002A] 12/12/2018   • DNR (do not resuscitate) [Z66] 12/12/2018   • Chronic a-fib (CMS/HCC) [I48.2] 09/04/2016   • Ischemic cardiomyopathy [I25.5] 09/04/2016   • CAD (coronary artery disease) [I25.10] 09/04/2016   • Hyponatremia [E87.1] 09/04/2016      Resolved Hospital Problems   No resolved problems to display.   Left Hip fracture  - from mechanical fall  - s/p  left hip IM nail 12/13/18  - pain control, mobility efforts  - incentive spirometry  - appreciate orthopedic surgery care     CAD/ICM/CHF  - last known LVEF 25% in 9/2016  - currently no cardiac symptoms and no signs of CHF-check   - keep K+>/=4.0 and Mg>/=2.0-give 1g MgSO4 today  - telemetry reviewed, postop ECG showing no significant changes  - continue lasix, metoprolol, lipitor    Leukocytosis  - likely reactive from surgery, no e/o infection  - improving without specific treatment    Confusion  - likely toxic encephalopathy from anesthesia  - resolved, will monitor    Constipation  - continue bowel regimen  - will try suppository    DVT Prophylaxis  - eliquis    Disposition  SNF tomorrow    John Quiroz MD  [facility]  12/16/18  4:20 PM

## 2018-12-16 NOTE — PROGRESS NOTES
Hip incisions with some serosanguineous drainage.  I have asked the dressings to be changed today.  She is mobilizing slowly with physical therapy.  She is now agreeable to SNF disposition.  I'm okay for discharge to SNF when cleared by medicine.  WBCs still elevated at 12, but trending down.  Hemoglobin okay.  Patient currently afebrile.    Suhas Alosno II, MD  Orthopaedic Surgery  [facility]

## 2018-12-17 ENCOUNTER — APPOINTMENT (OUTPATIENT)
Dept: CT IMAGING | Facility: HOSPITAL | Age: 83
End: 2018-12-17

## 2018-12-17 ENCOUNTER — APPOINTMENT (OUTPATIENT)
Dept: GENERAL RADIOLOGY | Facility: HOSPITAL | Age: 83
End: 2018-12-17
Attending: INTERNAL MEDICINE

## 2018-12-17 LAB
ANION GAP SERPL CALCULATED.3IONS-SCNC: 10.9 MMOL/L
BACTERIA UR QL AUTO: ABNORMAL /HPF
BASOPHILS # BLD AUTO: 0.01 10*3/MM3 (ref 0–0.2)
BASOPHILS NFR BLD AUTO: 0.1 % (ref 0–1.5)
BILIRUB UR QL STRIP: NEGATIVE
BUN BLD-MCNC: 19 MG/DL (ref 8–23)
BUN/CREAT SERPL: 32.8 (ref 7–25)
CALCIUM SPEC-SCNC: 8.3 MG/DL (ref 8.2–9.6)
CHLORIDE SERPL-SCNC: 89 MMOL/L (ref 98–107)
CLARITY UR: ABNORMAL
CO2 SERPL-SCNC: 25.1 MMOL/L (ref 22–29)
COLOR UR: YELLOW
CREAT BLD-MCNC: 0.58 MG/DL (ref 0.57–1)
DEPRECATED RDW RBC AUTO: 46.7 FL (ref 37–54)
EOSINOPHIL # BLD AUTO: 0.02 10*3/MM3 (ref 0–0.7)
EOSINOPHIL NFR BLD AUTO: 0.1 % (ref 0.3–6.2)
ERYTHROCYTE [DISTWIDTH] IN BLOOD BY AUTOMATED COUNT: 14.7 % (ref 11.7–13)
GFR SERPL CREATININE-BSD FRML MDRD: 97 ML/MIN/1.73
GLUCOSE BLD-MCNC: 107 MG/DL (ref 65–99)
GLUCOSE UR STRIP-MCNC: NEGATIVE MG/DL
HCT VFR BLD AUTO: 28.5 % (ref 35.6–45.5)
HGB BLD-MCNC: 9.8 G/DL (ref 11.9–15.5)
HGB UR QL STRIP.AUTO: ABNORMAL
HYALINE CASTS UR QL AUTO: ABNORMAL /LPF
IMM GRANULOCYTES # BLD: 0.06 10*3/MM3 (ref 0–0.03)
IMM GRANULOCYTES NFR BLD: 0.4 % (ref 0–0.5)
KETONES UR QL STRIP: NEGATIVE
LEUKOCYTE ESTERASE UR QL STRIP.AUTO: ABNORMAL
LYMPHOCYTES # BLD AUTO: 2.15 10*3/MM3 (ref 0.9–4.8)
LYMPHOCYTES NFR BLD AUTO: 13.7 % (ref 19.6–45.3)
MCH RBC QN AUTO: 29.5 PG (ref 26.9–32)
MCHC RBC AUTO-ENTMCNC: 34.4 G/DL (ref 32.4–36.3)
MCV RBC AUTO: 85.8 FL (ref 80.5–98.2)
MONOCYTES # BLD AUTO: 1.11 10*3/MM3 (ref 0.2–1.2)
MONOCYTES NFR BLD AUTO: 7.1 % (ref 5–12)
NEUTROPHILS # BLD AUTO: 12.36 10*3/MM3 (ref 1.9–8.1)
NEUTROPHILS NFR BLD AUTO: 79 % (ref 42.7–76)
NITRITE UR QL STRIP: POSITIVE
NRBC BLD MANUAL-RTO: 0 /100 WBC (ref 0–0)
PH UR STRIP.AUTO: 7 [PH] (ref 5–8)
PLATELET # BLD AUTO: 383 10*3/MM3 (ref 140–500)
PMV BLD AUTO: 9 FL (ref 6–12)
POTASSIUM BLD-SCNC: 3.8 MMOL/L (ref 3.5–5.2)
PROT UR QL STRIP: ABNORMAL
RBC # BLD AUTO: 3.32 10*6/MM3 (ref 3.9–5.2)
RBC # UR: ABNORMAL /HPF
REF LAB TEST METHOD: ABNORMAL
SODIUM BLD-SCNC: 125 MMOL/L (ref 136–145)
SP GR UR STRIP: 1.02 (ref 1–1.03)
SQUAMOUS #/AREA URNS HPF: ABNORMAL /HPF
UROBILINOGEN UR QL STRIP: ABNORMAL
WBC NRBC COR # BLD: 15.65 10*3/MM3 (ref 4.5–10.7)
WBC UR QL AUTO: ABNORMAL /HPF

## 2018-12-17 PROCEDURE — 25010000002 IOPAMIDOL 61 % SOLUTION: Performed by: INTERNAL MEDICINE

## 2018-12-17 PROCEDURE — 0 DIATRIZOATE MEGLUMINE & SODIUM PER 1 ML: Performed by: INTERNAL MEDICINE

## 2018-12-17 PROCEDURE — 97110 THERAPEUTIC EXERCISES: CPT

## 2018-12-17 PROCEDURE — 87186 SC STD MICRODIL/AGAR DIL: CPT | Performed by: INTERNAL MEDICINE

## 2018-12-17 PROCEDURE — 99221 1ST HOSP IP/OBS SF/LOW 40: CPT | Performed by: SURGERY

## 2018-12-17 PROCEDURE — 87086 URINE CULTURE/COLONY COUNT: CPT | Performed by: INTERNAL MEDICINE

## 2018-12-17 PROCEDURE — 25010000002 PIPERACILLIN SOD-TAZOBACTAM PER 1 G: Performed by: INTERNAL MEDICINE

## 2018-12-17 PROCEDURE — 74177 CT ABD & PELVIS W/CONTRAST: CPT

## 2018-12-17 PROCEDURE — 85025 COMPLETE CBC W/AUTO DIFF WBC: CPT | Performed by: INTERNAL MEDICINE

## 2018-12-17 PROCEDURE — 71045 X-RAY EXAM CHEST 1 VIEW: CPT

## 2018-12-17 PROCEDURE — 80048 BASIC METABOLIC PNL TOTAL CA: CPT | Performed by: INTERNAL MEDICINE

## 2018-12-17 PROCEDURE — 87077 CULTURE AEROBIC IDENTIFY: CPT | Performed by: INTERNAL MEDICINE

## 2018-12-17 PROCEDURE — 81001 URINALYSIS AUTO W/SCOPE: CPT | Performed by: INTERNAL MEDICINE

## 2018-12-17 RX ADMIN — SENNOSIDES AND DOCUSATE SODIUM 2 TABLET: 8.6; 5 TABLET ORAL at 08:18

## 2018-12-17 RX ADMIN — SODIUM CHLORIDE, PRESERVATIVE FREE 3 ML: 5 INJECTION INTRAVENOUS at 08:17

## 2018-12-17 RX ADMIN — IOPAMIDOL 85 ML: 612 INJECTION, SOLUTION INTRAVENOUS at 15:58

## 2018-12-17 RX ADMIN — METOPROLOL SUCCINATE 25 MG: 25 TABLET, FILM COATED, EXTENDED RELEASE ORAL at 08:17

## 2018-12-17 RX ADMIN — SENNOSIDES AND DOCUSATE SODIUM 2 TABLET: 8.6; 5 TABLET ORAL at 20:52

## 2018-12-17 RX ADMIN — APIXABAN 2.5 MG: 2.5 TABLET, FILM COATED ORAL at 08:18

## 2018-12-17 RX ADMIN — ATORVASTATIN CALCIUM 20 MG: 20 TABLET, FILM COATED ORAL at 20:52

## 2018-12-17 RX ADMIN — RANOLAZINE 1000 MG: 500 TABLET, FILM COATED, EXTENDED RELEASE ORAL at 20:52

## 2018-12-17 RX ADMIN — DIATRIZOATE MEGLUMINE AND DIATRIZOATE SODIUM 30 ML: 600; 100 SOLUTION ORAL; RECTAL at 12:45

## 2018-12-17 RX ADMIN — PANTOPRAZOLE SODIUM 40 MG: 40 TABLET, DELAYED RELEASE ORAL at 18:34

## 2018-12-17 RX ADMIN — TAZOBACTAM SODIUM AND PIPERACILLIN SODIUM 3.38 G: 375; 3 INJECTION, SOLUTION INTRAVENOUS at 15:15

## 2018-12-17 RX ADMIN — FUROSEMIDE 20 MG: 20 TABLET ORAL at 06:53

## 2018-12-17 RX ADMIN — ROPINIROLE 0.75 MG: 0.5 TABLET, FILM COATED ORAL at 20:52

## 2018-12-17 RX ADMIN — TAZOBACTAM SODIUM AND PIPERACILLIN SODIUM 3.38 G: 375; 3 INJECTION, SOLUTION INTRAVENOUS at 20:51

## 2018-12-17 RX ADMIN — RANOLAZINE 1000 MG: 500 TABLET, FILM COATED, EXTENDED RELEASE ORAL at 08:17

## 2018-12-17 RX ADMIN — SODIUM CHLORIDE, PRESERVATIVE FREE 3 ML: 5 INJECTION INTRAVENOUS at 20:52

## 2018-12-17 RX ADMIN — APIXABAN 2.5 MG: 2.5 TABLET, FILM COATED ORAL at 20:52

## 2018-12-17 RX ADMIN — SODIUM CHLORIDE, PRESERVATIVE FREE 10 ML: 5 INJECTION INTRAVENOUS at 15:15

## 2018-12-17 RX ADMIN — PANTOPRAZOLE SODIUM 40 MG: 40 TABLET, DELAYED RELEASE ORAL at 06:54

## 2018-12-17 NOTE — PROGRESS NOTES
Continued Stay Note  [facility]     Patient Name: Araceli Mitchell  MRN: [id number]  Today's Date: 12/17/2018    Admit Date: 12/12/2018    Discharge Plan     Row Name 12/17/18 1338       Plan    Plan  Plan [facility] for skilled care.  JENNIFER Pizano    Patient/Family in Agreement with Plan  yes    Plan Comments  Spoke to pt and pt's son ( Jordan Miller [phone]) at bedside.  Pt is agreeable to skilled care at [email or web address]   Nafisa  ( [phone]) called and she states bed are currently available and she is following pt.  Plan [facility] for skilled care.  JENNIFER Pizano        Discharge Codes    No documentation.             Caroline Howard, RN

## 2018-12-17 NOTE — PLAN OF CARE
Problem: Patient Care Overview  Goal: Plan of Care Review  Outcome: Ongoing (interventions implemented as appropriate)   12/17/18 1331   Coping/Psychosocial   Plan of Care Reviewed With patient   Plan of Care Review   Progress improving   OTHER   Outcome Summary Pt increasing with strength and balance with use of RWX when amb. Pt sat in chair and then pushed back and was sliding out of the chair with a twitching action. Pt then reclined and leaned back in chair. JENNIFER Hodgson made aware of episode.

## 2018-12-17 NOTE — PROGRESS NOTES
Name: Araceli Mitchell ADMIT: 2018   : 1926  PCP: Provider, No Known    MRN: [id number] LOS: 5 days   AGE/SEX: 89 y.o. female  ROOM: 49/   Subjective   CC: left hip pain  Patient had increased leukocytosis this AM, had straight bladder cath as part of the workup and this returned air and feculent material.  Patient states she thinks she has had this previously.  No CP/dyspnea/orthopnea/f/c/n/v/d.      Objective   Vital Signs  Temp:  [97.1 °F (36.2 °C)-98.2 °F (36.8 °C)] 98.2 °F (36.8 °C)  Heart Rate:  [69-79] 71  Resp:  [14-20] 14  BP: (120-145)/(66-87) 127/66  SpO2:  [94 %-99 %] 95 %  on   ;   Device (Oxygen Therapy): room air  Body mass index is 22.44 kg/m².    Physical Exam   Constitutional: She is oriented to person, place, and time. No distress.   HENT:   Head: Normocephalic and atraumatic.   Mouth/Throat: Oropharynx is clear and moist.   Eyes: Conjunctivae and EOM are normal. Pupils are equal, round, and reactive to light.   Neck: Normal range of motion. Neck supple.   Cardiovascular: Normal rate, regular rhythm and intact distal pulses.   Pulmonary/Chest: Effort normal and breath sounds normal. She has no rales.   Abdominal: Soft. Bowel sounds are normal.   Musculoskeletal: She exhibits no edema or tenderness.   LLE with surgical dressings c/d/i; neurovascularly intact   Neurological: She is alert and oriented to person, place, and time.   Skin: Skin is warm and dry. She is not diaphoretic.   Psychiatric: She has a normal mood and affect. Her behavior is normal.   Nursing note and vitals reviewed.      Results Review:       I reviewed the patient's new clinical results.  Results from last 7 days   Lab Units  18   0534  18   0713  12/15/18   0517  18   0527   WBC 10*3/mm3  15.65*  12.09*  13.04*  20.41*   HEMOGLOBIN g/dL  9.8*  9.4*  9.1*  11.0*   PLATELETS 10*3/mm3  383  336  300  366     Results from last 7 days   Lab Units  18   0534  18   0713  12/15/18   0517   12/14/18   0527   SODIUM mmol/L  125*  128*  130*  131*   POTASSIUM mmol/L  3.8  4.0  4.1  4.1   CHLORIDE mmol/L  89*  91*  94*  93*   CO2 mmol/L  25.1  24.0  26.7  23.0   BUN mg/dL  19  18  15  7*   CREATININE mg/dL  0.58  0.65  0.73  0.72   GLUCOSE mg/dL  107*  89  101*  118*   Estimated Creatinine Clearance: 36.9 mL/min (by C-G formula based on SCr of 0.58 mg/dL).  Results from last 7 days   Lab Units  12/12/18   1630   ALBUMIN g/dL  3.00*   BILIRUBIN mg/dL  0.6   ALK PHOS U/L  67   AST (SGOT) U/L  17   ALT (SGPT) U/L  10     Results from last 7 days   Lab Units  12/17/18   0534  12/16/18   0713  12/15/18   0517  12/14/18   0527   12/12/18   1630   CALCIUM mg/dL  8.3  8.3  8.5  8.9   < >  8.8   ALBUMIN g/dL   --    --    --    --    --   3.00*   MAGNESIUM mg/dL   --    --   1.8  2.0   --   2.1    < > = values in this interval not displayed.           apixaban 2.5 mg Oral Q12H   atorvastatin 20 mg Oral Nightly   furosemide 20 mg Oral QAM   metoprolol succinate XL 25 mg Oral Daily   pantoprazole 40 mg Oral BID AC   piperacillin-tazobactam 3.375 g Intravenous Once   piperacillin-tazobactam 3.375 g Intravenous Q8H   ranolazine 1,000 mg Oral Q12H   rOPINIRole 0.75 mg Oral Nightly   sennosides-docusate sodium 2 tablet Oral BID   sodium chloride 3 mL Intravenous Q12H       Pharmacy to Dose Zosyn    Diet Regular      Assessment/Plan      Active Hospital Problems    Diagnosis Date Noted   • **Closed left hip fracture, initial encounter (CMS/[facility]) [S72.002A] 12/12/2018   • Constipation [K59.00] 12/16/2018   • Postoperative anemia due to acute blood loss [D62] 12/15/2018   • Underweight [R63.6] 12/14/2018   • Leukocytosis [D72.829] 12/14/2018   • Encephalopathy, toxic [G92] 12/14/2018   • Chronic systolic CHF (congestive heart failure) (CMS/[facility]) [I50.22] 12/13/2018   • Closed fracture of left hip (CMS/[facility]) [S72.002A] 12/12/2018   • DNR (do not resuscitate) [Z66] 12/12/2018   • Chronic a-fib (CMS/[facility]) [I48.2] 09/04/2016   •  Ischemic cardiomyopathy [I25.5] 09/04/2016   • CAD (coronary artery disease) [I25.10] 09/04/2016   • Hyponatremia [E87.1] 09/04/2016      Resolved Hospital Problems   No resolved problems to display.   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

## 2018-12-17 NOTE — PROGRESS NOTES
Patient sleeping upon entry room.  Dressings look okay.  No overnight issues.  Possible discharge to rehabilitation today.  Okay from orthopedic standpoint for discharge to rehabilitation when bed available.    Suhas Alonso II, MD  Orthopaedic Surgery  [facility]

## 2018-12-17 NOTE — THERAPY TREATMENT NOTE
Acute Care - Physical Therapy Treatment Note  [facility]     Patient Name: Araceli Mitchell  : 1926  MRN: [id number]  Today's Date: 2018  Onset of Illness/Injury or Date of Surgery: 18     Referring Physician: Gavin    Admit Date: 2018    Visit Dx:    ICD-10-CM ICD-9-CM   1. Closed fracture of left hip, initial encounter (CMS/HCC) S72.002A 820.8   2. S/p left hip fracture Z87.81 V15.51   3. Fall, initial encounter W19.XXXA E888.9   4. Decreased mobility R26.89 781.99     Patient Active Problem List   Diagnosis   • NSTEMI (non-ST elevated myocardial infarction) (CMS/HCC)   • CAD (coronary artery disease)   • Ischemic cardiomyopathy   • Metabolic encephalopathy   • Chronic a-fib (CMS/HCC)   • Hyponatremia   • Hyperlipidemia   • Pulmonary HTN (CMS/HCC)   • GERD (gastroesophageal reflux disease)   • RLS (restless legs syndrome)   • Pulmonary fibrosis (CMS/HCC)   • Closed fracture of left hip (CMS/HCC)   • Closed left hip fracture, initial encounter (CMS/HCC)   • DNR (do not resuscitate)   • Chronic systolic CHF (congestive heart failure) (CMS/HCC)   • Underweight   • Leukocytosis   • Encephalopathy, toxic   • Postoperative anemia due to acute blood loss   • Constipation       Therapy Treatment    Rehabilitation Treatment Summary     Row Name 18 1300             Treatment Time/Intention    Discipline  physical therapy assistant  -CW      Document Type  therapy note (daily note)  -CW      Subjective Information  complains of;weakness;fatigue  -CW      Mode of Treatment  physical therapy  -CW      Therapy Frequency (PT Clinical Impression)  daily  -CW      Patient Effort  good  -CW      Existing Precautions/Restrictions  fall  -CW      Recorded by [CW] Earnest Cheung P, PTA 18 1331      Row Name 18 1300             Vital Signs    O2 Delivery Pre Treatment  room air  -CW      Recorded by [CW] Earnest Cheung PTA 18 1331      Row Name 18 1300              Cognitive Assessment/Intervention- PT/OT    Orientation Status (Cognition)  oriented x 4  -CW      Follows Commands (Cognition)  WFL  -CW      Safety Deficit (Cognitive)  mild deficit  -CW      Personal Safety Interventions  fall prevention program maintained;gait belt;muscle strengthening facilitated;nonskid shoes/slippers when out of bed  -CW      Recorded by [CW] Earnest Cheung, PTA 12/17/18 1331      Row Name 12/17/18 1300             Bed Mobility Assessment/Treatment    Bed Mobility Assessment/Treatment  supine-sit  -CW      Supine-Sit Stephens (Bed Mobility)  minimum assist (75% patient effort)  -CW      Assistive Device (Bed Mobility)  bed rails  -CW      Recorded by [CW] Earnest Cheung, PTA 12/17/18 1331      Row Name 12/17/18 1300             Transfer Assessment/Treatment    Transfer Assessment/Treatment  sit-stand transfer;stand-sit transfer  -CW      Recorded by [CW] Earnest Cheung, PTA 12/17/18 1331      Row Name 12/17/18 1300             Sit-Stand Transfer    Sit-Stand Stephens (Transfers)  minimum assist (75% patient effort)  -CW      Assistive Device (Sit-Stand Transfers)  walker, front-wheeled  -CW      Recorded by [CW] Earnest Cheung, PTA 12/17/18 1331      Row Name 12/17/18 1300             Stand-Sit Transfer    Stand-Sit Stephens (Transfers)  minimum assist (75% patient effort)  -CW      Assistive Device (Stand-Sit Transfers)  walker, front-wheeled  -CW      Recorded by [CW] Earnest Cheung, PTA 12/17/18 1331      Row Name 12/17/18 1300             Gait/Stairs Assessment/Training    Stephens Level (Gait)  minimum assist (75% patient effort)  -CW      Assistive Device (Gait)  walker, front-wheeled  -CW      Distance in Feet (Gait)  40  -CW      Pattern (Gait)  step-to  -CW      Deviations/Abnormal Patterns (Gait)  ataxic;antalgic;base of support, narrow;festinating/shuffling;gait speed decreased;stride length decreased  -CW      Bilateral Gait Deviations   forward flexed posture;heel strike decreased  -CW      Recorded by [CW] Earnest Cheung, PTA 12/17/18 1331      Row Name 12/17/18 1300             Positioning and Restraints    Pre-Treatment Position  in bed  -CW      Post Treatment Position  chair  -CW      In Chair  notified nsg;reclined;call light within reach;encouraged to call for assist;exit alarm on;with family/caregiver  -CW      Recorded by [CW] Earnest Cheung, PTA 12/17/18 1331      Row Name 12/17/18 1300             Pain Assessment    Additional Documentation  Pain Scale: Numbers Pre/Post-Treatment (Group)  -CW      Recorded by [CW] Earnest Cheung, PTA 12/17/18 1331      Row Name 12/17/18 1300             Pain Scale: Numbers Pre/Post-Treatment    Pain Scale: Numbers, Pretreatment  1/10  -CW      Pain Scale: Numbers, Post-Treatment  2/10  -CW      Pain Location - Side  Left  -CW      Pain Location  hip  -CW      Pain Intervention(s)  Repositioned;Ambulation/increased activity  -CW      Recorded by [CW] Earnest Cheung, PTA 12/17/18 1331      Row Name                Wound 12/13/18 1406 Left hip incision    Wound - Properties Group Date first assessed: 12/13/18 [LD] Time first assessed: 1406 [LD] Side: Left [LD] Location: hip [LD] Type: incision [LD] Recorded by:  [LD] Odalys Diaz RN 12/13/18 1406    Row Name 12/17/18 1300             Outcome Summary/Treatment Plan (PT)    Anticipated Discharge Disposition (PT)  skilled nursing facility  -CW      Recorded by [CW] Earnest Cheung, PTA 12/17/18 1331        User Key  (r) = Recorded By, (t) = Taken By, (c) = Cosigned By    Initials Name Effective Dates Discipline    LD Odalys Diaz, RN 06/16/16 -  Nurse    CW Earnest Cheung PTA 03/07/18 -  PT          Wound 12/13/18 1406 Left hip incision (Active)   Dressing Appearance dry;intact 12/17/2018  8:15 AM   Closure [place] 12/17/2018  8:15 AM   Drainage Amount none 12/17/2018 12:00 AM   Care, Wound cleansed with;sterile normal saline  12/16/2018  6:38 PM   Dressing Care, Wound dressing applied;dressing changed 12/16/2018  6:38 PM           Physical Therapy Education     Title: PT OT SLP Therapies (Done)     Topic: Physical Therapy (Done)     Point: Mobility training (Done)     Learning Progress Summary           Patient Acceptance, E,TB, VU,DU by CW at 12/17/2018  1:31 PM    Acceptance, E,TB, VU,DU by LS at 12/16/2018  2:09 PM    Acceptance, E,TB, VU,DU by LS at 12/15/2018  2:56 PM    Acceptance, E, NR by EM at 12/14/2018  1:57 PM                   Point: Home exercise program (Done)     Learning Progress Summary           Patient Acceptance, E,TB, VU,DU by CW at 12/17/2018  1:31 PM    Acceptance, E,TB, VU,DU by LS at 12/16/2018  2:09 PM    Acceptance, E,TB, VU,DU by LS at 12/15/2018  2:56 PM    Acceptance, E, NR by EM at 12/14/2018  1:57 PM                   Point: Body mechanics (Done)     Learning Progress Summary           Patient Acceptance, E,TB, VU,DU by CW at 12/17/2018  1:31 PM    Acceptance, E,TB, VU,DU by LS at 12/16/2018  2:09 PM    Acceptance, E,TB, VU,DU by LS at 12/15/2018  2:56 PM                   Point: Precautions (Done)     Learning Progress Summary           Patient Acceptance, E,TB, VU,DU by CW at 12/17/2018  1:31 PM    Acceptance, E,TB, VU,DU by LS at 12/16/2018  2:09 PM    Acceptance, E,TB, VU,DU by LS at 12/15/2018  2:56 PM                               User Key     Initials Effective Dates Name Provider Type Discipline    EM 04/03/18 -  Thania Rojo, PT Physical Therapist PT     04/03/18 -  Candice Munoz, PT Physical Therapist PT     03/07/18 -  Earnest Cheung, PTA Physical Therapy Assistant PT                PT Recommendation and Plan  Anticipated Discharge Disposition (PT): skilled nursing facility  Therapy Frequency (PT Clinical Impression): daily  Outcome Summary/Treatment Plan (PT)  Anticipated Discharge Disposition (PT): skilled nursing facility  Plan of Care Reviewed With: patient  Progress:  improving  Outcome Summary: Pt increasing with strength and balance with use of RWX when amb.  Pt sat in chair and then pushed back and was sliding out of the chair with a twitching action.  Pt then reclined and leaned back in chair.  JENNIFER Hodgson made aware of episode.    Outcome Measures     Row Name 12/17/18 1300 12/16/18 1400 12/15/18 1400       How much help from another person do you currently need...    Turning from your back to your side while in flat bed without using bedrails?  3  -CW  3  -LS  2  -LS    Moving from lying on back to sitting on the side of a flat bed without bedrails?  3  -CW  2  -LS  2  -LS    Moving to and from a bed to a chair (including a wheelchair)?  3  -CW  3  -LS  2  -LS    Standing up from a chair using your arms (e.g., wheelchair, bedside chair)?  3  -CW  3  -LS  2  -LS    Climbing 3-5 steps with a railing?  1  -CW  1  -LS  1  -LS    To walk in hospital room?  3  -CW  3  -LS  2  -LS    AM-PAC 6 Clicks Score  16  -CW  15  -LS  11  -LS       Functional Assessment    Outcome Measure Options  AM-PAC 6 Clicks Basic Mobility (PT)  -CW  AM-PAC 6 Clicks Basic Mobility (PT)  -LS  AM-PAC 6 Clicks Basic Mobility (PT)  -LS      User Key  (r) = Recorded By, (t) = Taken By, (c) = Cosigned By    Initials Name Provider Type    LS Candice Munoz, PT Physical Therapist    CW Earnest Cheung PTA Physical Therapy Assistant         Time Calculation:   PT Charges     Row Name 12/17/18 1333             Time Calculation    Start Time  1309  -CW      Stop Time  1334  -CW      Time Calculation (min)  25 min  -CW      PT Received On  12/17/18  -CW      PT - Next Appointment  12/18/18  -CW        User Key  (r) = Recorded By, (t) = Taken By, (c) = Cosigned By    Initials Name Provider Type    Earnest Ashford, LOWELL Physical Therapy Assistant        Therapy Suggested Charges     Code   Minutes Charges    None           Therapy Charges for Today     Code Description Service Date Service Provider  Modifiers Qty    18570545660 HC PT THER PROC EA 15 MIN 12/17/2018 Earnest Cheung, PTA GP 2          PT G-Codes  Outcome Measure Options: AM-PAC 6 Clicks Basic Mobility (PT)  AM-PAC 6 Clicks Score: 16    Earnest Cheung, LOWELL  12/17/2018

## 2018-12-17 NOTE — PROGRESS NOTES
"Pharmacokinetic Consult - Zosyn Dosing    Araceli Mitchell is a 89 y.o. female who is on day 1 pharmacy to dose Zosyn for intra-abdominal/urinary tract infection (potential enterovesicle fistula)  Pharmacy dosing Zosyn per Dr. Reynolds's request.   Other antimicrobials: N/A    HPI: Ms. Mitchell is a 89 yo F admitted 12/12 for left hip fracture. During hospitalization, pt developed leukocytosis which initially improved without specific treatment but increased today. Urine cath returned air and feculent material concerning for enterovesicle fistula    Relevant clinical data and objective history reviewed:  152.4 cm (60\")  52.1 kg (114 lb 14.4 oz)  Body mass index is 22.44 kg/m².     She has a past medical history of CHF (congestive heart failure) (CMS/[facility]), Hyperlipidemia, and Hypertension.    Allergies as of 12/12/2018 - Reviewed 12/12/2018   Allergen Reaction Noted   • Clopidogrel bisulfate Other (See Comments) 11/21/2013   • Lisinopril Other (See Comments) 11/21/2013     Vital Signs (last 24 hours)       12/16 0700  -  12/17 0659 12/17 0700  -  12/17 1347   Most Recent    Temp (°F) 97.1 -  97.7    97.5 -  98.2     97.5 (36.4)    Heart Rate 69 -  79    60 -  71     60    Resp 16 -  20    14 -  16     16    /87 -  145/78    127/65 -  127/66     127/65    SpO2 (%) 92 -  99      95     95        Estimated Creatinine Clearance: 36.9 mL/min (by C-G formula based on SCr of 0.58 mg/dL).  Results from last 7 days   Lab Units  12/17/18   0534  12/16/18   0713  12/15/18   0517   CREATININE mg/dL  0.58  0.65  0.73     Results from last 7 days   Lab Units  12/17/18   0534  12/16/18   0713  12/15/18   0517   WBC 10*3/mm3  15.65*  12.09*  13.04*     Baseline culture/source/susceptibility:   UA had loaded field    Imaging:   Contrasted abdominal CT pending       Assessment/Plan  Agree with current regimen of Zosyn 3.375g IV q8hr as per [facility] dosing guidelines as estimated CrCl 37 mL/min at this time    Pharmacy will " continue to follow daily while on Zosyn and adjust as needed.     Thanks,   Anuradha Madrid  PharmD Candidate

## 2018-12-17 NOTE — PLAN OF CARE
Problem: Patient Care Overview  Goal: Plan of Care Review  Outcome: Ongoing (interventions implemented as appropriate)   12/17/18 0400   Coping/Psychosocial   Plan of Care Reviewed With patient   OTHER   Outcome Summary Patient  denies any pain,refused pain meds. Not in any distress. Vital signs as recorded. Inciional dressing dry and intact,changed last night . Possible discharge to SNF today .Fall precautions enforced.  Had multiple bowel movement. Monitored.     Goal: Discharge Needs Assessment  Outcome: Ongoing (interventions implemented as appropriate)      Problem: Fall Risk (Adult)  Goal: Absence of Fall  Outcome: Ongoing (interventions implemented as appropriate)      Problem: Fracture Orthopaedic (Adult)  Goal: Signs and Symptoms of Listed Potential Problems Will be Absent, Minimized or Managed (Fracture Orthopaedic)  Outcome: Ongoing (interventions implemented as appropriate)      Problem: Skin Injury Risk (Adult)  Goal: Identify Related Risk Factors and Signs and Symptoms  Outcome: Ongoing (interventions implemented as appropriate)    Goal: Skin Health and Integrity  Outcome: Ongoing (interventions implemented as appropriate)      Problem: Pain, Acute (Adult)  Goal: Identify Related Risk Factors and Signs and Symptoms  Outcome: Ongoing (interventions implemented as appropriate)    Goal: Acceptable Pain Control/Comfort Level  Outcome: Ongoing (interventions implemented as appropriate)      Problem: Cardiac: Heart Failure (Adult)  Intervention: Prevent/Manage DVT/VTE Risk  ON  Eliquis    Goal: Signs and Symptoms of Listed Potential Problems Will be Absent, Minimized or Managed (Cardiac: Heart Failure)  Outcome: Ongoing (interventions implemented as appropriate)

## 2018-12-17 NOTE — PLAN OF CARE
Problem: Patient Care Overview  Goal: Plan of Care Review  Outcome: Ongoing (interventions implemented as appropriate)   12/17/18 1527   Coping/Psychosocial   Plan of Care Reviewed With patient   Plan of Care Review   Progress improving   OTHER   Outcome Summary PT ALERT AND ORIENTED UP IN THE CHAIR WITH SON AT BEDSIDE, URINE OBTAINED THROUGH IN AND OUT CATH. STOOL NOTED IN THE URINE. CALLED DR COLEY AND NOTIFIED HIM. DR COLEY PLACED ORDERS, INFORMED PT OF THE ORDERS. PT STATES SHE NEW SHE WAS PASSING STOOL AND AIR THROUGH HER URETHA. SHE STATES THIS HAS BEEN HAPPENING FOR ABOUT A YEAR. ZOSYN ADMINISTERED PER ORDERS. NO ACUTE DISTRESS NOTED, WILL CONTINUE TO MONITOR.     Goal: Individualization and Mutuality  Outcome: Ongoing (interventions implemented as appropriate)    Goal: Discharge Needs Assessment  Outcome: Ongoing (interventions implemented as appropriate)    Goal: Interprofessional Rounds/Family Conf  Outcome: Ongoing (interventions implemented as appropriate)      Problem: Fall Risk (Adult)  Goal: Absence of Fall  Outcome: Ongoing (interventions implemented as appropriate)      Problem: Fracture Orthopaedic (Adult)  Goal: Signs and Symptoms of Listed Potential Problems Will be Absent, Minimized or Managed (Fracture Orthopaedic)  Outcome: Ongoing (interventions implemented as appropriate)      Problem: Skin Injury Risk (Adult)  Goal: Identify Related Risk Factors and Signs and Symptoms  Outcome: Ongoing (interventions implemented as appropriate)    Goal: Skin Health and Integrity  Outcome: Ongoing (interventions implemented as appropriate)      Problem: Pain, Acute (Adult)  Goal: Identify Related Risk Factors and Signs and Symptoms  Outcome: Ongoing (interventions implemented as appropriate)    Goal: Acceptable Pain Control/Comfort Level  Outcome: Ongoing (interventions implemented as appropriate)      Problem: Cardiac: Heart Failure (Adult)  Goal: Signs and Symptoms of Listed Potential Problems Will be  Absent, Minimized or Managed (Cardiac: Heart Failure)  Outcome: Ongoing (interventions implemented as appropriate)

## 2018-12-18 PROBLEM — N32.1 ENTEROVESICAL FISTULA: Status: ACTIVE | Noted: 2018-12-18

## 2018-12-18 PROBLEM — R19.00 PELVIC MASS: Status: ACTIVE | Noted: 2018-12-18

## 2018-12-18 LAB
ANION GAP SERPL CALCULATED.3IONS-SCNC: 10.7 MMOL/L
BASOPHILS # BLD AUTO: 0 10*3/MM3 (ref 0–0.2)
BASOPHILS NFR BLD AUTO: 0 % (ref 0–1.5)
BUN BLD-MCNC: 14 MG/DL (ref 8–23)
BUN/CREAT SERPL: 25.9 (ref 7–25)
CALCIUM SPEC-SCNC: 8.2 MG/DL (ref 8.2–9.6)
CHLORIDE SERPL-SCNC: 87 MMOL/L (ref 98–107)
CO2 SERPL-SCNC: 26.3 MMOL/L (ref 22–29)
CREAT BLD-MCNC: 0.54 MG/DL (ref 0.57–1)
DEPRECATED RDW RBC AUTO: 46.7 FL (ref 37–54)
EOSINOPHIL # BLD AUTO: 0.07 10*3/MM3 (ref 0–0.7)
EOSINOPHIL NFR BLD AUTO: 0.5 % (ref 0.3–6.2)
ERYTHROCYTE [DISTWIDTH] IN BLOOD BY AUTOMATED COUNT: 15 % (ref 11.7–13)
GFR SERPL CREATININE-BSD FRML MDRD: 106 ML/MIN/1.73
GLUCOSE BLD-MCNC: 95 MG/DL (ref 65–99)
HCT VFR BLD AUTO: 27.8 % (ref 35.6–45.5)
HGB BLD-MCNC: 9.5 G/DL (ref 11.9–15.5)
IMM GRANULOCYTES # BLD: 0.07 10*3/MM3 (ref 0–0.03)
IMM GRANULOCYTES NFR BLD: 0.5 % (ref 0–0.5)
LYMPHOCYTES # BLD AUTO: 1.83 10*3/MM3 (ref 0.9–4.8)
LYMPHOCYTES NFR BLD AUTO: 14.2 % (ref 19.6–45.3)
MCH RBC QN AUTO: 29.2 PG (ref 26.9–32)
MCHC RBC AUTO-ENTMCNC: 34.2 G/DL (ref 32.4–36.3)
MCV RBC AUTO: 85.5 FL (ref 80.5–98.2)
MONOCYTES # BLD AUTO: 0.66 10*3/MM3 (ref 0.2–1.2)
MONOCYTES NFR BLD AUTO: 5.1 % (ref 5–12)
NEUTROPHILS # BLD AUTO: 10.36 10*3/MM3 (ref 1.9–8.1)
NEUTROPHILS NFR BLD AUTO: 80.2 % (ref 42.7–76)
PLATELET # BLD AUTO: 374 10*3/MM3 (ref 140–500)
PMV BLD AUTO: 8.8 FL (ref 6–12)
POTASSIUM BLD-SCNC: 3.8 MMOL/L (ref 3.5–5.2)
RBC # BLD AUTO: 3.25 10*6/MM3 (ref 3.9–5.2)
SODIUM BLD-SCNC: 124 MMOL/L (ref 136–145)
WBC NRBC COR # BLD: 12.92 10*3/MM3 (ref 4.5–10.7)

## 2018-12-18 PROCEDURE — 97110 THERAPEUTIC EXERCISES: CPT

## 2018-12-18 PROCEDURE — 80048 BASIC METABOLIC PNL TOTAL CA: CPT | Performed by: INTERNAL MEDICINE

## 2018-12-18 PROCEDURE — 36415 COLL VENOUS BLD VENIPUNCTURE: CPT | Performed by: INTERNAL MEDICINE

## 2018-12-18 PROCEDURE — 85025 COMPLETE CBC W/AUTO DIFF WBC: CPT | Performed by: INTERNAL MEDICINE

## 2018-12-18 PROCEDURE — 25010000002 PIPERACILLIN SOD-TAZOBACTAM PER 1 G: Performed by: INTERNAL MEDICINE

## 2018-12-18 RX ADMIN — SENNOSIDES AND DOCUSATE SODIUM 2 TABLET: 8.6; 5 TABLET ORAL at 21:00

## 2018-12-18 RX ADMIN — FUROSEMIDE 20 MG: 20 TABLET ORAL at 06:06

## 2018-12-18 RX ADMIN — PANTOPRAZOLE SODIUM 40 MG: 40 TABLET, DELAYED RELEASE ORAL at 17:38

## 2018-12-18 RX ADMIN — TAZOBACTAM SODIUM AND PIPERACILLIN SODIUM 3.38 G: 375; 3 INJECTION, SOLUTION INTRAVENOUS at 04:50

## 2018-12-18 RX ADMIN — APIXABAN 2.5 MG: 2.5 TABLET, FILM COATED ORAL at 21:00

## 2018-12-18 RX ADMIN — SODIUM CHLORIDE, PRESERVATIVE FREE 3 ML: 5 INJECTION INTRAVENOUS at 08:45

## 2018-12-18 RX ADMIN — METOPROLOL SUCCINATE 25 MG: 25 TABLET, FILM COATED, EXTENDED RELEASE ORAL at 08:44

## 2018-12-18 RX ADMIN — SENNOSIDES AND DOCUSATE SODIUM 2 TABLET: 8.6; 5 TABLET ORAL at 08:45

## 2018-12-18 RX ADMIN — SODIUM CHLORIDE, PRESERVATIVE FREE 3 ML: 5 INJECTION INTRAVENOUS at 21:01

## 2018-12-18 RX ADMIN — APIXABAN 2.5 MG: 2.5 TABLET, FILM COATED ORAL at 08:44

## 2018-12-18 RX ADMIN — ROPINIROLE 0.75 MG: 0.5 TABLET, FILM COATED ORAL at 21:01

## 2018-12-18 RX ADMIN — PANTOPRAZOLE SODIUM 40 MG: 40 TABLET, DELAYED RELEASE ORAL at 06:06

## 2018-12-18 RX ADMIN — ATORVASTATIN CALCIUM 20 MG: 20 TABLET, FILM COATED ORAL at 21:01

## 2018-12-18 RX ADMIN — RANOLAZINE 1000 MG: 500 TABLET, FILM COATED, EXTENDED RELEASE ORAL at 08:44

## 2018-12-18 RX ADMIN — RANOLAZINE 1000 MG: 500 TABLET, FILM COATED, EXTENDED RELEASE ORAL at 21:00

## 2018-12-18 NOTE — PLAN OF CARE
Problem: Patient Care Overview  Goal: Plan of Care Review  Outcome: Ongoing (interventions implemented as appropriate)   12/18/18 0308   Coping/Psychosocial   Plan of Care Reviewed With patient   Plan of Care Review   Progress improving   OTHER   Outcome Summary Patient continues IV Zosyn, no side effects noted. Seen by Surgery Dr Ratliff for fistula, signed off as patient desires no surgical intervention. Drtessing remain intact to left hip with no drainage noted. Has not complained of any pain. Nursing will continue to monitor.      Goal: Individualization and Mutuality  Outcome: Ongoing (interventions implemented as appropriate)    Goal: Discharge Needs Assessment  Outcome: Ongoing (interventions implemented as appropriate)    Goal: Interprofessional Rounds/Family Conf  Outcome: Ongoing (interventions implemented as appropriate)      Problem: Fall Risk (Adult)  Goal: Absence of Fall  Outcome: Ongoing (interventions implemented as appropriate)      Problem: Fracture Orthopaedic (Adult)  Goal: Signs and Symptoms of Listed Potential Problems Will be Absent, Minimized or Managed (Fracture Orthopaedic)  Outcome: Ongoing (interventions implemented as appropriate)      Problem: Skin Injury Risk (Adult)  Goal: Identify Related Risk Factors and Signs and Symptoms  Outcome: Outcome(s) achieved Date Met: 12/18/18    Goal: Skin Health and Integrity  Outcome: Ongoing (interventions implemented as appropriate)      Problem: Pain, Acute (Adult)  Goal: Identify Related Risk Factors and Signs and Symptoms  Outcome: Outcome(s) achieved Date Met: 12/18/18    Goal: Acceptable Pain Control/Comfort Level  Outcome: Ongoing (interventions implemented as appropriate)      Problem: Cardiac: Heart Failure (Adult)  Goal: Signs and Symptoms of Listed Potential Problems Will be Absent, Minimized or Managed (Cardiac: Heart Failure)  Outcome: Ongoing (interventions implemented as appropriate)

## 2018-12-18 NOTE — CONSULTS
Inpatient General Surgery Consult  Consult performed by: Papa Ratliff Jr., MD  Consult ordered by: John Quiroz MD          Patient Care Team:  Provider, No Known as PCP - General  Provider, No Known as PCP - Family Medicine  Elisabet Guerrero as PCP - Claims Attributed    Chief complaint: Colovesical fistula    Subjective     History of Present Illness     The patient is a pleasant 89-year-old female who was admitted on 12/12/2018 after falling and fracturing her left hip.  She underwent a left hip IM nail on 12/13/2016.  She has a leukocytosis that was first felt to be related to surgery but an in and out catheterization was performed and she was noted have a large amount of air.  Concern was raised over a colovesical fistula.  The patient has a one-year history of pneumaturia and even stool or urine.  A CT scan of the abdomen and pelvis was performed that shows a significant hiatal hernia as well as a large mass in the right pelvis with an obvious fistula with the bowel and bladder.    The patient is not having abdominal pain.  She has a very poor appetite and normal bowel function.    Review of Systems   Constitutional: Positive for activity change. Negative for appetite change, fatigue and fever.   Respiratory: Negative for chest tightness and shortness of breath.    Cardiovascular: Negative for chest pain and palpitations.   Gastrointestinal: Negative for abdominal pain, blood in stool, constipation, diarrhea, nausea and vomiting.        Past Medical History:   Diagnosis Date   • CHF (congestive heart failure) (CMS/HCC)    • Hyperlipidemia    • Hypertension    ,   Past Surgical History:   Procedure Laterality Date   • CARDIAC SURGERY      stent placement   ,   Family History   Problem Relation Age of Onset   • Heart attack Mother    • Heart attack Father    ,   Social History     Tobacco Use   • Smoking status: Former Smoker   • Smokeless tobacco: Never Used   • Tobacco comment: 50 yrs as of 2016    Substance Use Topics   • Alcohol use: No   • Drug use: No    and Allergies:  Clopidogrel bisulfate and Lisinopril    Objective      Vital Signs  Temp:  [97.1 °F (36.2 °C)-98.5 °F (36.9 °C)] 98.5 °F (36.9 °C)  Heart Rate:  [60-79] 61  Resp:  [14-20] 16  BP: (105-145)/(58-87) 105/58    Physical Exam   Constitutional: She is oriented to person, place, and time. She appears well-developed and well-nourished.  Non-toxic appearance.   Eyes: EOM are normal. No scleral icterus.   Pulmonary/Chest: Effort normal. No respiratory distress.   Abdominal: Soft. Normal appearance. She exhibits mass (Large palpable right pelvic mass). She exhibits no distension. There is no tenderness.   Neurological: She is alert and oriented to person, place, and time.   Skin: Skin is warm and dry.   Psychiatric: She has a normal mood and affect. Her behavior is normal. Judgment and thought content normal.       Results Review:    I reviewed the patient's new clinical results.        Assessment/Plan       Closed left hip fracture, initial encounter (CMS/[facility])    CAD (coronary artery disease)    Ischemic cardiomyopathy    Chronic a-fib (CMS/[facility])    Hyponatremia    Closed fracture of left hip (CMS/[facility])    DNR (do not resuscitate)    Chronic systolic CHF (congestive heart failure) (CMS/[facility])    Underweight    Leukocytosis    Encephalopathy, toxic    Postoperative anemia due to acute blood loss    Constipation      Assessment & Plan     1.  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  There appears to be a fistula between the bowel and her bladder.  This was discussed with her.  She does not want any treatment at this time.  She states she wants to live out her final days comfortable.  I will sign off but remain available if needed.    I discussed the patients findings and my recommendations with patient    Papa Ratliff Jr., MD  12/17/18  7:55 PM

## 2018-12-18 NOTE — PLAN OF CARE
Problem: Patient Care Overview  Goal: Plan of Care Review  Outcome: Ongoing (interventions implemented as appropriate)   12/18/18 1115   Coping/Psychosocial   Plan of Care Reviewed With patient   Plan of Care Review   Progress improving   OTHER   Outcome Summary Pt increased with strength and balance with use of RWX and required more A with amb today due to fatigue and pt with increased fall risk

## 2018-12-18 NOTE — THERAPY TREATMENT NOTE
Acute Care - Physical Therapy Treatment Note  [facility]     Patient Name: Araceli Mitchell  : 1926  MRN: [id number]  Today's Date: 2018  Onset of Illness/Injury or Date of Surgery: 18     Referring Physician: Gavin    Admit Date: 2018    Visit Dx:    ICD-10-CM ICD-9-CM   1. Closed fracture of left hip, initial encounter (CMS/HCC) S72.002A 820.8   2. S/p left hip fracture Z87.81 V15.51   3. Fall, initial encounter W19.XXXA E888.9   4. Decreased mobility R26.89 781.99     Patient Active Problem List   Diagnosis   • NSTEMI (non-ST elevated myocardial infarction) (CMS/HCC)   • CAD (coronary artery disease)   • Ischemic cardiomyopathy   • Metabolic encephalopathy   • Chronic a-fib (CMS/HCC)   • Hyponatremia   • Hyperlipidemia   • Pulmonary HTN (CMS/HCC)   • GERD (gastroesophageal reflux disease)   • RLS (restless legs syndrome)   • Pulmonary fibrosis (CMS/HCC)   • Closed fracture of left hip (CMS/HCC)   • Closed left hip fracture, initial encounter (CMS/HCC)   • DNR (do not resuscitate)   • Chronic systolic CHF (congestive heart failure) (CMS/HCC)   • Underweight   • Leukocytosis   • Encephalopathy, toxic   • Postoperative anemia due to acute blood loss   • Constipation       Therapy Treatment    Rehabilitation Treatment Summary     Row Name 18 1100             Treatment Time/Intention    Discipline  physical therapy assistant  -CW      Document Type  therapy note (daily note)  -CW      Subjective Information  complains of;weakness;fatigue  -CW      Mode of Treatment  physical therapy  -CW      Therapy Frequency (PT Clinical Impression)  daily  -CW      Patient Effort  good  -CW      Existing Precautions/Restrictions  fall  -CW      Recorded by [CW] Earnest Cheung P, PTA 18 1114      Row Name 18 1100             Vital Signs    O2 Delivery Pre Treatment  room air  -CW      Recorded by [CW] Earnest Cheung PTA 18 1114      Row Name 18 1100              Cognitive Assessment/Intervention- PT/OT    Orientation Status (Cognition)  oriented x 4  -CW      Follows Commands (Cognition)  WFL  -CW      Safety Deficit (Cognitive)  mild deficit  -CW      Personal Safety Interventions  fall prevention program maintained;gait belt;muscle strengthening facilitated;nonskid shoes/slippers when out of bed  -CW      Recorded by [CW] Earnest Cheung, PTA 12/18/18 1114      Row Name 12/18/18 1100             Bed Mobility Assessment/Treatment    Supine-Sit Rough And Ready (Bed Mobility)  not tested  -CW      Comment (Bed Mobility)  up in chair  -CW      Recorded by [CW] Eranest Cheung, PTA 12/18/18 1114      Row Name 12/18/18 1100             Transfer Assessment/Treatment    Transfer Assessment/Treatment  sit-stand transfer;stand-sit transfer  -CW      Recorded by [CW] Earnest Cheung, PTA 12/18/18 1114      Row Name 12/18/18 1100             Sit-Stand Transfer    Sit-Stand Rough And Ready (Transfers)  minimum assist (75% patient effort)  -CW      Assistive Device (Sit-Stand Transfers)  walker, front-wheeled  -CW      Recorded by [CW] Earnest Cheung, PTA 12/18/18 1114      Row Name 12/18/18 1100             Stand-Sit Transfer    Stand-Sit Rough And Ready (Transfers)  minimum assist (75% patient effort)  -CW      Assistive Device (Stand-Sit Transfers)  walker, front-wheeled  -CW      Recorded by [CW] Earnest Cheung, PTA 12/18/18 1114      Row Name 12/18/18 1100             Gait/Stairs Assessment/Training    Rough And Ready Level (Gait)  minimum assist (75% patient effort);2 person assist  -CW      Assistive Device (Gait)  walker, front-wheeled  -CW      Distance in Feet (Gait)  40  -CW      Pattern (Gait)  step-to  -CW      Deviations/Abnormal Patterns (Gait)  ataxic;antalgic;base of support, narrow;festinating/shuffling;gait speed decreased;stride length decreased  -CW      Bilateral Gait Deviations  forward flexed posture;heel strike decreased  -CW      Comment  (Gait/Stairs)  Pt get unsafe with amb after ~30' and requires more A  -CW      Recorded by [CW] Earnest Cheung, PTA 12/18/18 1114      Row Name 12/18/18 1100             Positioning and Restraints    Pre-Treatment Position  sitting in chair/recliner  -CW      Post Treatment Position  chair  -CW      In Chair  notified nsg;reclined;call light within reach;encouraged to call for assist;exit alarm on;with family/caregiver  -CW      Recorded by [CW] Earnest Cheung, PTA 12/18/18 1114      Row Name 12/18/18 1100             Pain Assessment    Additional Documentation  Pain Scale: Numbers Pre/Post-Treatment (Group)  -CW      Recorded by [CW] Earnest Cheung, PTA 12/18/18 1114      Row Name 12/18/18 1100             Pain Scale: Numbers Pre/Post-Treatment    Pain Scale: Numbers, Pretreatment  1/10  -CW      Pain Scale: Numbers, Post-Treatment  2/10  -CW      Pain Location - Side  Left  -CW      Pain Location  hip  -CW      Pain Intervention(s)  Medication (See MAR);Repositioned;Ambulation/increased activity  -CW      Recorded by [CW] Earnest Cheung, PTA 12/18/18 1114      Row Name                Wound 12/13/18 1406 Left hip incision    Wound - Properties Group Date first assessed: 12/13/18 [LD] Time first assessed: 1406 [LD] Side: Left [LD] Location: hip [LD] Type: incision [LD] Recorded by:  [LD] Odalys Diaz RN 12/13/18 1406    Row Name 12/18/18 1100             Outcome Summary/Treatment Plan (PT)    Anticipated Discharge Disposition (PT)  skilled nursing facility  -CW      Recorded by [CW] Earnest Cheung, PTA 12/18/18 1114        User Key  (r) = Recorded By, (t) = Taken By, (c) = Cosigned By    Initials Name Effective Dates Discipline    LD Odalys Diaz RN 06/16/16 -  Nurse    CW Earnest Cheung PTA 03/07/18 -  PT          Wound 12/13/18 1406 Left hip incision (Active)   Dressing Appearance moist drainage 12/18/2018  9:32 AM   Closure Staples 12/18/2018  9:32 AM   Base dry;pink  12/18/2018  9:32 AM   Periwound dry;pink 12/18/2018  9:32 AM   Periwound Temperature warm 12/18/2018  9:32 AM   Periwound Skin Turgor soft 12/18/2018  9:32 AM   Edges rolled/closed 12/18/2018  9:32 AM   Drainage Amount scant 12/18/2018  9:32 AM   Care, Wound cleansed with;sterile normal saline 12/18/2018  9:32 AM   Dressing Care, Wound border dressing 12/18/2018  9:32 AM           Physical Therapy Education     Title: PT OT SLP Therapies (Done)     Topic: Physical Therapy (Done)     Point: Mobility training (Done)     Learning Progress Summary           Patient Acceptance, E,TB, VU,DU by CW at 12/18/2018 11:15 AM    Acceptance, E,TB, VU,DU by CW at 12/17/2018  1:31 PM    Acceptance, E,TB, VU,DU by LS at 12/16/2018  2:09 PM    Acceptance, E,TB, VU,DU by LS at 12/15/2018  2:56 PM    Acceptance, E, NR by EM at 12/14/2018  1:57 PM                   Point: Home exercise program (Done)     Learning Progress Summary           Patient Acceptance, E,TB, VU,DU by CW at 12/18/2018 11:15 AM    Acceptance, E,TB, VU,DU by CW at 12/17/2018  1:31 PM    Acceptance, E,TB, VU,DU by LS at 12/16/2018  2:09 PM    Acceptance, E,TB, VU,DU by LS at 12/15/2018  2:56 PM    Acceptance, E, NR by EM at 12/14/2018  1:57 PM                   Point: Body mechanics (Done)     Learning Progress Summary           Patient Acceptance, E,TB, VU,DU by CW at 12/18/2018 11:15 AM    Acceptance, E,TB, VU,DU by CW at 12/17/2018  1:31 PM    Acceptance, E,TB, VU,DU by LS at 12/16/2018  2:09 PM    Acceptance, E,TB, VU,DU by LS at 12/15/2018  2:56 PM                   Point: Precautions (Done)     Learning Progress Summary           Patient Acceptance, E,TB, VU,DU by CW at 12/18/2018 11:15 AM    Acceptance, E,TB, VU,DU by CW at 12/17/2018  1:31 PM    Acceptance, E,TB, VU,DU by LOU at 12/16/2018  2:09 PM    Acceptance, E,TB, VU,DU by OLU at 12/15/2018  2:56 PM                               User Key     Initials Effective Dates Name Provider Type Discipline    EM  04/03/18 -  Thania Rojo, PT Physical Therapist PT    LS 04/03/18 -  Candice Munoz, PT Physical Therapist PT    CW 03/07/18 -  Earnest Cheung, PTA Physical Therapy Assistant PT                PT Recommendation and Plan  Anticipated Discharge Disposition (PT): skilled nursing facility  Therapy Frequency (PT Clinical Impression): daily  Outcome Summary/Treatment Plan (PT)  Anticipated Discharge Disposition (PT): skilled nursing facility  Plan of Care Reviewed With: patient  Progress: improving  Outcome Summary: Pt increased with strength and balance with use of RWX and required more A with amb today due to fatigue and pt with increased fall risk   Outcome Measures     Row Name 12/18/18 1100 12/17/18 1300 12/16/18 1400       How much help from another person do you currently need...    Turning from your back to your side while in flat bed without using bedrails?  3  -CW  3  -CW  3  -LS    Moving from lying on back to sitting on the side of a flat bed without bedrails?  3  -CW  3  -CW  2  -LS    Moving to and from a bed to a chair (including a wheelchair)?  3  -CW  3  -CW  3  -LS    Standing up from a chair using your arms (e.g., wheelchair, bedside chair)?  3  -CW  3  -CW  3  -LS    Climbing 3-5 steps with a railing?  1  -CW  1  -CW  1  -LS    To walk in hospital room?  2  -CW  3  -CW  3  -LS    AM-PAC 6 Clicks Score  15  -CW  16  -CW  15  -LS       Functional Assessment    Outcome Measure Options  AM-PAC 6 Clicks Basic Mobility (PT)  -CW  AM-PAC 6 Clicks Basic Mobility (PT)  -CW  AM-PAC 6 Clicks Basic Mobility (PT)  -LS    Row Name 12/15/18 1400             How much help from another person do you currently need...    Turning from your back to your side while in flat bed without using bedrails?  2  -LS      Moving from lying on back to sitting on the side of a flat bed without bedrails?  2  -LS      Moving to and from a bed to a chair (including a wheelchair)?  2  -LS      Standing up from a chair using  your arms (e.g., wheelchair, bedside chair)?  2  -LS      Climbing 3-5 steps with a railing?  1  -LS      To walk in hospital room?  2  -LS      AM-PAC 6 Clicks Score  11  -LS         Functional Assessment    Outcome Measure Options  AM-PAC 6 Clicks Basic Mobility (PT)  -LS        User Key  (r) = Recorded By, (t) = Taken By, (c) = Cosigned By    Initials Name Provider Type    Candice Hale, PT Physical Therapist    CW Earnest Cheung, LOWELL Physical Therapy Assistant         Time Calculation:   PT Charges     Row Name 12/18/18 1116             Time Calculation    Start Time  1100  -CW      Stop Time  1116  -CW      Time Calculation (min)  16 min  -CW      PT Received On  12/18/18  -CW      PT - Next Appointment  12/19/18  -CW        User Key  (r) = Recorded By, (t) = Taken By, (c) = Cosigned By    Initials Name Provider Type    Earnest Ashford PTA Physical Therapy Assistant        Therapy Suggested Charges     Code   Minutes Charges    None           Therapy Charges for Today     Code Description Service Date Service Provider Modifiers Qty    36440480194 HC PT THER PROC EA 15 MIN 12/17/2018 Earnest Cheung PTA GP 2    10093439611 HC PT THER PROC EA 15 MIN 12/18/2018 Earnest Cheung, LOWELL GP 1    55283769258 HC PT THER SUPP EA 15 MIN 12/18/2018 Earnest Cheung, LOWELL GP 1          PT G-Codes  Outcome Measure Options: AM-PAC 6 Clicks Basic Mobility (PT)  AM-PAC 6 Clicks Score: 15    Earnest Cheung PTA  12/18/2018

## 2018-12-18 NOTE — PROGRESS NOTES
Name: Araceli Mitchell ADMIT: 2018   : 1926  PCP: Provider, No Known    MRN: [id number] LOS: 6 days   AGE/SEX: 89 y.o. female  ROOM: 49/   Subjective   CC: left hip pain  No acute events.  Patient has no new complaints.  CT scan revealed pelvic mass and enterovesical fistula.  No CP/dyspnea/orthopnea/f/c/n/v/d.    Objective   Vital Signs  Temp:  [97.4 °F (36.3 °C)-98.7 °F (37.1 °C)] 98.6 °F (37 °C)  Heart Rate:  [61-78] 78  Resp:  [16-18] 18  BP: (105-129)/(58-76) 121/76  SpO2:  [95 %-97 %] 97 %  on   ;   Device (Oxygen Therapy): room air  Body mass index is 23.1 kg/m².    Physical Exam   Constitutional: She is oriented to person, place, and time. No distress.   HENT:   Head: Normocephalic and atraumatic.   Mouth/Throat: Oropharynx is clear and moist.   Eyes: Conjunctivae and EOM are normal. Pupils are equal, round, and reactive to light.   Neck: Normal range of motion. Neck supple.   Cardiovascular: Normal rate, regular rhythm and intact distal pulses.   Pulmonary/Chest: Effort normal and breath sounds normal. She has no rales.   Abdominal: Soft. Bowel sounds are normal.   Musculoskeletal: She exhibits no edema or tenderness.   LLE with surgical dressings c/d/i; neurovascularly intact   Neurological: She is alert and oriented to person, place, and time.   Skin: Skin is warm and dry. She is not diaphoretic.   Psychiatric: She has a normal mood and affect. Her behavior is normal.   Nursing note and vitals reviewed.      Results Review:       I reviewed the patient's new clinical results.  Results from last 7 days   Lab Units  18   0538  18   0534  18   0713  12/15/18   0517   WBC 10*3/mm3  12.92*  15.65*  12.09*  13.04*   HEMOGLOBIN g/dL  9.5*  9.8*  9.4*  9.1*   PLATELETS 10*3/mm3  374  383  336  300     Results from last 7 days   Lab Units  18   0538  18   0534  18   0713  12/15/18   0517   SODIUM mmol/L  124*  125*  128*  130*   POTASSIUM mmol/L  3.8  3.8  4.0   4.1   CHLORIDE mmol/L  87*  89*  91*  94*   CO2 mmol/L  26.3  25.1  24.0  26.7   BUN mg/dL  14  19  18  15   CREATININE mg/dL  0.54*  0.58  0.65  0.73   GLUCOSE mg/dL  95  107*  89  101*   Estimated Creatinine Clearance: 38 mL/min (A) (by C-G formula based on SCr of 0.54 mg/dL (L)).  Results from last 7 days   Lab Units  12/12/18   1630   ALBUMIN g/dL  3.00*   BILIRUBIN mg/dL  0.6   ALK PHOS U/L  67   AST (SGOT) U/L  17   ALT (SGPT) U/L  10     Results from last 7 days   Lab Units  12/18/18   0538  12/17/18   0534  12/16/18   0713  12/15/18   0517  12/14/18   0527   12/12/18   1630   CALCIUM mg/dL  8.2  8.3  8.3  8.5  8.9   < >  8.8   ALBUMIN g/dL   --    --    --    --    --    --   3.00*   MAGNESIUM mg/dL   --    --    --   1.8  2.0   --   2.1    < > = values in this interval not displayed.           apixaban 2.5 mg Oral Q12H   atorvastatin 20 mg Oral Nightly   furosemide 20 mg Oral QAM   metoprolol succinate XL 25 mg Oral Daily   pantoprazole 40 mg Oral BID AC   ranolazine 1,000 mg Oral Q12H   rOPINIRole 0.75 mg Oral Nightly   sennosides-docusate sodium 2 tablet Oral BID   sodium chloride 3 mL Intravenous Q12H      Diet Regular      Assessment/Plan      Active Hospital Problems    Diagnosis Date Noted   • **Closed left hip fracture, initial encounter (CMS/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) [I50.22] 12/13/2018   • Closed fracture of left hip (CMS/[facility]) [S72.002A] 12/12/2018   • DNR (do not resuscitate) [Z66] 12/12/2018   • Chronic a-fib (CMS/[facility]) [I48.2] 09/04/2016   • Ischemic cardiomyopathy [I25.5] 09/04/2016   • CAD (coronary artery disease) [I25.10] 09/04/2016   •  Hyponatremia [E87.1] 09/04/2016      Resolved Hospital Problems   No resolved problems to display.   Left Hip fracture  - from mechanical fall  - s/p left hip IM nail 12/13/18  - pain control, mobility efforts  - incentive spirometry  - appreciate orthopedic surgery care     CAD/ICM/CHF  - last known LVEF 25% in 9/2016  - currently no cardiac symptoms and no signs of CHF-check   - keep K+>/=4.0 and Mg>/=2.0-give 1g MgSO4 today  - telemetry reviewed, postop ECG showed no significant changes  - continue lasix, metoprolol, lipitor    Enerovesical Fistula  - confirmed on CT scan, has pelvic mass which is suspicious for ovarian cancer  - general surgery has evaluated, patient does not wish for any intervention at this time  - stop abx as she desires comfort measures only    Constipation  - resolved  - continue bowel regimen    DVT Prophylaxis  - eliquis    Disposition  TBD    The patient expressed that she does not want any further treatment, and that she just wants to live out the remainder of her days as comfortable as possible.  We did discuss hospice care and she is interested in this.  Will stop daily blood draws and antibiotics.  [facility] consulted and CCP notified for discharge planning.      John Quiroz MD  [facility]  12/18/18  6:43 PM

## 2018-12-18 NOTE — PLAN OF CARE
Problem: Patient Care Overview  Goal: Plan of Care Review  Outcome: Ongoing (interventions implemented as appropriate)   12/18/18 9906   Coping/Psychosocial   Plan of Care Reviewed With patient   Plan of Care Review   Progress improving   OTHER   Outcome Summary PATIENT ALERT AND ORIENTED. DENIES PAIN. NO ACUTE DISTRESS NOTED, WILL CONTINUE TO MONITOR. HOSPICE TO SEE PT TOMORROW AT 0930.     Goal: Individualization and Mutuality  Outcome: Ongoing (interventions implemented as appropriate)    Goal: Discharge Needs Assessment  Outcome: Ongoing (interventions implemented as appropriate)    Goal: Interprofessional Rounds/Family Conf  Outcome: Ongoing (interventions implemented as appropriate)      Problem: Fall Risk (Adult)  Goal: Absence of Fall  Outcome: Ongoing (interventions implemented as appropriate)      Problem: Fracture Orthopaedic (Adult)  Goal: Signs and Symptoms of Listed Potential Problems Will be Absent, Minimized or Managed (Fracture Orthopaedic)  Outcome: Ongoing (interventions implemented as appropriate)      Problem: Skin Injury Risk (Adult)  Goal: Skin Health and Integrity  Outcome: Ongoing (interventions implemented as appropriate)      Problem: Pain, Acute (Adult)  Goal: Acceptable Pain Control/Comfort Level  Outcome: Ongoing (interventions implemented as appropriate)      Problem: Cardiac: Heart Failure (Adult)  Goal: Signs and Symptoms of Listed Potential Problems Will be Absent, Minimized or Managed (Cardiac: Heart Failure)  Outcome: Ongoing (interventions implemented as appropriate)

## 2018-12-19 PROCEDURE — 97110 THERAPEUTIC EXERCISES: CPT

## 2018-12-19 RX ADMIN — SODIUM CHLORIDE, PRESERVATIVE FREE 3 ML: 5 INJECTION INTRAVENOUS at 21:01

## 2018-12-19 RX ADMIN — FUROSEMIDE 20 MG: 20 TABLET ORAL at 07:03

## 2018-12-19 RX ADMIN — SENNOSIDES AND DOCUSATE SODIUM 2 TABLET: 8.6; 5 TABLET ORAL at 21:01

## 2018-12-19 RX ADMIN — APIXABAN 2.5 MG: 2.5 TABLET, FILM COATED ORAL at 09:54

## 2018-12-19 RX ADMIN — RANOLAZINE 1000 MG: 500 TABLET, FILM COATED, EXTENDED RELEASE ORAL at 21:00

## 2018-12-19 RX ADMIN — ACETAMINOPHEN 650 MG: 325 TABLET, FILM COATED ORAL at 23:41

## 2018-12-19 RX ADMIN — PANTOPRAZOLE SODIUM 40 MG: 40 TABLET, DELAYED RELEASE ORAL at 17:11

## 2018-12-19 RX ADMIN — METOPROLOL SUCCINATE 25 MG: 25 TABLET, FILM COATED, EXTENDED RELEASE ORAL at 09:54

## 2018-12-19 RX ADMIN — APIXABAN 2.5 MG: 2.5 TABLET, FILM COATED ORAL at 21:01

## 2018-12-19 RX ADMIN — SODIUM CHLORIDE, PRESERVATIVE FREE 3 ML: 5 INJECTION INTRAVENOUS at 09:53

## 2018-12-19 RX ADMIN — SENNOSIDES AND DOCUSATE SODIUM 2 TABLET: 8.6; 5 TABLET ORAL at 09:53

## 2018-12-19 RX ADMIN — PANTOPRAZOLE SODIUM 40 MG: 40 TABLET, DELAYED RELEASE ORAL at 07:03

## 2018-12-19 RX ADMIN — RANOLAZINE 1000 MG: 500 TABLET, FILM COATED, EXTENDED RELEASE ORAL at 09:53

## 2018-12-19 RX ADMIN — ATORVASTATIN CALCIUM 20 MG: 20 TABLET, FILM COATED ORAL at 21:00

## 2018-12-19 RX ADMIN — ROPINIROLE 0.75 MG: 0.5 TABLET, FILM COATED ORAL at 21:00

## 2018-12-19 NOTE — PROGRESS NOTES
This patient is now 6 days status post Intramedullary nailing of left hip fracture.  Her incision is still draining serosanguineous fluid.  Postoperative course has been complicated by the finding of a large Pelvic mass with colovesical fistula. Patient is requesting conservative treatment at this time.  Given this, it is possible that with her continued drainage she could become infected.  Patient wishes for minimal intervention at this time.  I will continue to follow along.    Suhas Alonso II, MD  Orthopaedic Surgery  [facility]

## 2018-12-19 NOTE — PROGRESS NOTES
"DAILY PROGRESS NOTE  [facility]    Patient Identification:  Name: Araceli Mitchell  Age: 89 y.o.  Sex: female  :  1926  MRN: [id number]         Primary Care Physician: Provider, No Known      Subjective  No new c/o.      Objective:  General Appearance:  Comfortable, well-appearing, in no acute distress and not in pain.    Vital signs: (most recent): Blood pressure 127/85, pulse 76, temperature 98.6 °F (37 °C), temperature source Oral, resp. rate 18, height 152.4 cm (60\"), weight 56.3 kg (124 lb 3.2 oz), SpO2 98 %.    Lungs:  Normal effort and normal respiratory rate.  Breath sounds clear to auscultation.    Heart: Normal rate.  Regular rhythm.    Extremities: There is no dependent edema.    Neurological: Patient is alert.  (Oriented to person and place. ).    Skin:  Warm and dry.                Vital signs in last 24 hours:  Temp:  [97.6 °F (36.4 °C)-98.7 °F (37.1 °C)] 98.6 °F (37 °C)  Heart Rate:  [65-88] 76  Resp:  [16-18] 18  BP: (127-151)/(71-85) 127/85    Intake/Output:    Intake/Output Summary (Last 24 hours) at 2018 1614  Last data filed at 2018 1825  Gross per 24 hour   Intake 180 ml   Output --   Net 180 ml         Results from last 7 days   Lab Units  18   0538  18   0534  18   0713  12/15/18   0517  18   0527  18   0450   WBC 10*3/mm3  12.92*  15.65*  12.09*  13.04*  20.41*  10.28   HEMOGLOBIN g/dL  9.5*  9.8*  9.4*  9.1*  11.0*  10.4*   PLATELETS 10*3/mm3  374  383  336  300  366  342     Results from last 7 days   Lab Units  18   0538  18   0534  18   0713  12/15/18   0517  18   0527  18   0450  18   1630   SODIUM mmol/L  124*  125*  128*  130*  131*  134*  132*   POTASSIUM mmol/L  3.8  3.8  4.0  4.1  4.1  4.0  3.8   CHLORIDE mmol/L  87*  89*  91*  94*  93*  97*  94*   CO2 mmol/L  26.3  25.1  24.0  26.7  23.0  25.6  25.4   BUN mg/dL  14  19  18  15  7*  8  7*   CREATININE mg/dL  0.54*  0.58  0.65  0.73  " 0.72  0.52*  0.62   GLUCOSE mg/dL  95  107*  89  101*  118*  96  124*   Estimated Creatinine Clearance: 35.3 mL/min (A) (by C-G formula based on SCr of 0.54 mg/dL (L)).  Results from last 7 days   Lab Units  12/18/18   0538  12/17/18   0534  12/16/18   0713  12/15/18   0517  12/14/18   0527  12/13/18   0450  12/12/18   1630   CALCIUM mg/dL  8.2  8.3  8.3  8.5  8.9  8.3  8.8   ALBUMIN g/dL   --    --    --    --    --    --   3.00*   MAGNESIUM mg/dL   --    --    --   1.8  2.0   --   2.1     Results from last 7 days   Lab Units  12/12/18   1630   ALBUMIN g/dL  3.00*   BILIRUBIN mg/dL  0.6   ALK PHOS U/L  67   AST (SGOT) U/L  17   ALT (SGPT) U/L  10       Assessment:    Closed left hip fracture, initial encounter (CMS/[facility])    CAD (coronary artery disease)    Ischemic cardiomyopathy    Chronic a-fib (CMS/[facility])    Hyponatremia    Closed fracture of left hip (CMS/[facility])    DNR (do not resuscitate)    Chronic systolic CHF (congestive heart failure) (CMS/[facility])    Underweight    Leukocytosis    Encephalopathy, toxic    Postoperative anemia due to acute blood loss    Constipation    Enterovesical fistula    Pelvic mass        Plan:  Awaiting d/c arrangement.  Probably home w Hospice tomorrow.   Discussed w CCP and RN.     Rodney Hdz MD  12/19/2018  4:14 PM

## 2018-12-19 NOTE — THERAPY TREATMENT NOTE
Acute Care - Physical Therapy Treatment Note  [facility]     Patient Name: Araceli Mitchell  : 1926  MRN: [id number]  Today's Date: 2018  Onset of Illness/Injury or Date of Surgery: 18     Referring Physician: Gavin    Admit Date: 2018    Visit Dx:    ICD-10-CM ICD-9-CM   1. Closed fracture of left hip, initial encounter (CMS/HCC) S72.002A 820.8   2. S/p left hip fracture Z87.81 V15.51   3. Fall, initial encounter W19.XXXA E888.9   4. Decreased mobility R26.89 781.99     Patient Active Problem List   Diagnosis   • NSTEMI (non-ST elevated myocardial infarction) (CMS/[facility])   • CAD (coronary artery disease)   • Ischemic cardiomyopathy   • Metabolic encephalopathy   • Chronic a-fib (CMS/HCC)   • Hyponatremia   • Hyperlipidemia   • Pulmonary HTN (CMS/HCC)   • GERD (gastroesophageal reflux disease)   • RLS (restless legs syndrome)   • Pulmonary fibrosis (CMS/HCC)   • Closed fracture of left hip (CMS/HCC)   • Closed left hip fracture, initial encounter (CMS/HCC)   • DNR (do not resuscitate)   • Chronic systolic CHF (congestive heart failure) (CMS/HCC)   • Underweight   • Leukocytosis   • Encephalopathy, toxic   • Postoperative anemia due to acute blood loss   • Constipation   • Enterovesical fistula   • Pelvic mass       Therapy Treatment    Rehabilitation Treatment Summary     Row Name 18 1537             Treatment Time/Intention    Discipline  physical therapy assistant  -      Document Type  therapy note (daily note)  -EH2      Subjective Information  complains of;pain;fatigue  -EH2      Mode of Treatment  physical therapy  -EH2      Patient/Family Observations  Pt supine in bed  -EH2      Care Plan Review  patient/other agree to care plan  -EH2      Therapy Frequency (PT Clinical Impression)  daily  -EH2      Patient Effort  good  -EH2      Existing Precautions/Restrictions  fall  -EH2      Recorded by [EH] Asia Michael, PTA 18 1556  [EH2] Asia Michael, PTA 18 1622      Francie  Name 12/19/18 1537             Cognitive Assessment/Intervention- PT/OT    Orientation Status (Cognition)  oriented to;person  -      Follows Commands (Cognition)  follows one step commands;75-90% accuracy  -      Safety Deficit (Cognitive)  mild deficit;insight into deficits/self awareness  -      Personal Safety Interventions  fall prevention program maintained;gait belt;nonskid shoes/slippers when out of bed  -      Recorded by [] Asia Michael, [facility] 12/19/18 1622      Row Name 12/19/18 1537             Bed Mobility Assessment/Treatment    Supine-Sit [place] (Bed Mobility)  minimum assist (75% patient effort)  -      Sit-Supine [place] (Bed Mobility)  minimum assist (75% patient effort)  -      Bed Mobility, Safety Issues  decreased use of arms for pushing/pulling  -      Assistive Device (Bed Mobility)  bed rails  -      Recorded by [] Asia Michael, [facility] 12/19/18 1622      Row Name 12/19/18 1537             Gait/Stairs Assessment/Training    Comment (Gait/Stairs)  Pt declined ambulation due to just getting back into bed after being on BSC  -      Recorded by [] Asia Michael, PTA 12/19/18 1622      Row Name 12/19/18 1537             Therapeutic Exercise    Lower Extremity (Therapeutic Exercise)  LAQ (long arc quad), bilateral;quad sets, bilateral  -      Lower Extremity Range of Motion (Therapeutic Exercise)  hip abduction/adduction, bilateral;ankle dorsiflexion/plantar flexion, bilateral  -      Exercise Type (Therapeutic Exercise)  AROM (active range of motion)  -      Position (Therapeutic Exercise)  seated;supine  -      Sets/Reps (Therapeutic Exercise)  X10-15  -      Recorded by [] Asia Michael, PTA 12/19/18 1622      Row Name 12/19/18 1537             Static Sitting Balance    Level of [place] (Unsupported Sitting, Static Balance)  supervision  -      Sitting Position (Unsupported Sitting, Static Balance)  sitting on edge of bed  -      Recorded by  [] Asia Michael, PTA 12/19/18 1622      Row Name 12/19/18 1537             Positioning and Restraints    Pre-Treatment Position  in bed  -      Post Treatment Position  bed  -      In Bed  supine;call light within reach;encouraged to call for assist;exit alarm on  -EH      Recorded by [EH] Asia Michael, PTA 12/19/18 1622      Row Name                Wound 12/13/18 1406 Left hip incision    Wound - Properties Group Date first assessed: 12/13/18 [LD] Time first assessed: 1406 [LD] Side: Left [LD] Location: hip [LD] Type: incision [LD] Recorded by:  [LD] Odalys Diaz RN 12/13/18 1406      User Key  (r) = Recorded By, (t) = Taken By, (c) = Cosigned By    Initials Name Effective Dates Discipline    LD Odalys Diaz RN 06/16/16 -  Nurse     Asia Michael PTA 08/19/18 -  PT          Wound 12/13/18 1406 Left hip incision (Active)   Dressing Appearance intact;moist drainage 12/19/2018  2:00 PM   Closure [place] 12/19/2018  2:00 PM   Base dressing in place, unable to visualize 12/19/2018  2:00 PM   Drainage Characteristics/Odor serosanguineous 12/19/2018  2:00 PM   Drainage Amount moderate 12/19/2018  2:00 PM   Dressing Care, Wound border dressing 12/19/2018  2:00 PM           Physical Therapy Education     Title: PT OT SLP Therapies (Done)     Topic: Physical Therapy (Done)     Point: Mobility training (Done)     Learning Progress Summary           Patient Acceptance, E, VU,DU by  at 12/19/2018  3:55 PM    Acceptance, E,TB, VU,DU by CW at 12/18/2018 11:15 AM    Acceptance, E,TB, VU,DU by CW at 12/17/2018  1:31 PM    Acceptance, E,TB, VU,DU by LS at 12/16/2018  2:09 PM    Acceptance, E,TB, VU,DU by LS at 12/15/2018  2:56 PM    Acceptance, E, NR by EM at 12/14/2018  1:57 PM                   Point: Home exercise program (Done)     Learning Progress Summary           Patient Acceptance, E, VU,DU by  at 12/19/2018  3:55 PM    Acceptance, E,TB, VU,DU by CW at 12/18/2018 11:15 AM    Acceptance, MITCHELL MELLO VU, DU by CW at  12/17/2018  1:31 PM    Acceptance, E,TB, VU,DU by LS at 12/16/2018  2:09 PM    Acceptance, E,TB, VU,DU by LS at 12/15/2018  2:56 PM    Acceptance, E, NR by EM at 12/14/2018  1:57 PM                   Point: Body mechanics (Done)     Learning Progress Summary           Patient Acceptance, E, VU,DU by  at 12/19/2018  3:55 PM    Acceptance, E,TB, VU,DU by CW at 12/18/2018 11:15 AM    Acceptance, E,TB, VU,DU by CW at 12/17/2018  1:31 PM    Acceptance, E,TB, VU,DU by LS at 12/16/2018  2:09 PM    Acceptance, E,TB, VU,DU by LS at 12/15/2018  2:56 PM                   Point: Precautions (Done)     Learning Progress Summary           Patient Acceptance, E, VU,DU by  at 12/19/2018  3:55 PM    Acceptance, E,TB, VU,DU by CW at 12/18/2018 11:15 AM    Acceptance, E,TB, VU,DU by CW at 12/17/2018  1:31 PM    Acceptance, E,TB, VU,DU by LS at 12/16/2018  2:09 PM    Acceptance, E,TB, VU,DU by LS at 12/15/2018  2:56 PM                               User Key     Initials Effective Dates Name Provider Type Discipline     04/03/18 -  Thania Rojo, PT Physical Therapist PT     04/03/18 -  Candice Munoz, PT Physical Therapist PT     03/07/18 -  Earnest Cheung, PTA Physical Therapy Assistant PT     08/19/18 -  Asia Michael, LOWELL Physical Therapy Assistant PT                PT Recommendation and Plan  Therapy Frequency (PT Clinical Impression): daily  Plan of Care Reviewed With: patient  Progress: improving  Outcome Summary: Pt tolerated treatment with minimal c/o pain. Pt declined ambulation due to just getting back into bed after being on BSC. Pt agreed to sit on EOB with supervision and performed seated/supine bilateral LE exercises today.   Outcome Measures     Row Name 12/19/18 1500 12/18/18 1100 12/17/18 1300       How much help from another person do you currently need...    Turning from your back to your side while in flat bed without using bedrails?  3  -  3  -CW  3  -CW    Moving from lying on back to  sitting on the side of a flat bed without bedrails?  3  -EH  3  -CW  3  -CW    Moving to and from a bed to a chair (including a wheelchair)?  3  -EH  3  -CW  3  -CW    Standing up from a chair using your arms (e.g., wheelchair, bedside chair)?  3  -EH  3  -CW  3  -CW    Climbing 3-5 steps with a railing?  1  -EH  1  -CW  1  -CW    To walk in hospital room?  2  -EH  2  -CW  3  -CW    AM-PAC 6 Clicks Score  15  -EH  15  -CW  16  -CW       Functional Assessment    Outcome Measure Options  --  AM-PAC 6 Clicks Basic Mobility (PT)  -CW  AM-PAC 6 Clicks Basic Mobility (PT)  -CW      User Key  (r) = Recorded By, (t) = Taken By, (c) = Cosigned By    Initials Name Provider Type    CW Earnest Cheung PTA Physical Therapy Assistant     Asia Michael PTA Physical Therapy Assistant         Time Calculation:   PT Charges     Row Name 12/19/18 1555             Time Calculation    Start Time  1537  -      Stop Time  1553  -      Time Calculation (min)  16 min  -      PT Received On  12/19/18  -      PT - Next Appointment  12/20/18  -         Time Calculation- PT    Total Timed Code Minutes- PT  16 minute(s)  -        User Key  (r) = Recorded By, (t) = Taken By, (c) = Cosigned By    Initials Name Provider Type     Asia Michael PTA Physical Therapy Assistant        Therapy Suggested Charges     Code   Minutes Charges    None           Therapy Charges for Today     Code Description Service Date Service Provider Modifiers Qty    11117520920 HC PT THER PROC EA 15 MIN 12/19/2018 Asia Michael PTA GP 1          PT G-Codes  Outcome Measure Options: AM-PAC 6 Clicks Basic Mobility (PT)  AM-PAC 6 Clicks Score: 15    Asia Michael PTA  12/19/2018

## 2018-12-19 NOTE — PLAN OF CARE
Problem: Patient Care Overview  Goal: Plan of Care Review  Outcome: Ongoing (interventions implemented as appropriate)   12/19/18 2088   Coping/Psychosocial   Plan of Care Reviewed With patient   Plan of Care Review   Progress improving   OTHER   Outcome Summary Pt tolerated treatment with minimal c/o pain. Pt declined ambulation due to just getting back into bed after being on BSC. Pt agreed to sit on EOB with supervision and performed seated/supine bilateral LE exercises today.

## 2018-12-19 NOTE — PLAN OF CARE
Problem: Patient Care Overview  Goal: Plan of Care Review  Outcome: Ongoing (interventions implemented as appropriate)   12/19/18 1440   OTHER   Outcome Summary Pt alert but confused. Confusion has improved a little as the day has gone on. Dressing change to incision, wound is still draining (MD is aware). CCP working on D/C planning. Son at bedside. Up to chair. VSS. Will continue to monitor patient.     Goal: Individualization and Mutuality  Outcome: Ongoing (interventions implemented as appropriate)    Goal: Discharge Needs Assessment  Outcome: Ongoing (interventions implemented as appropriate)    Goal: Interprofessional Rounds/Family Conf  Outcome: Ongoing (interventions implemented as appropriate)      Problem: Fall Risk (Adult)  Goal: Absence of Fall  Outcome: Ongoing (interventions implemented as appropriate)      Problem: Fracture Orthopaedic (Adult)  Goal: Signs and Symptoms of Listed Potential Problems Will be Absent, Minimized or Managed (Fracture Orthopaedic)  Outcome: Ongoing (interventions implemented as appropriate)      Problem: Skin Injury Risk (Adult)  Goal: Skin Health and Integrity  Outcome: Ongoing (interventions implemented as appropriate)      Problem: Pain, Acute (Adult)  Goal: Acceptable Pain Control/Comfort Level  Outcome: Ongoing (interventions implemented as appropriate)      Problem: Cardiac: Heart Failure (Adult)  Goal: Signs and Symptoms of Listed Potential Problems Will be Absent, Minimized or Managed (Cardiac: Heart Failure)  Outcome: Ongoing (interventions implemented as appropriate)

## 2018-12-20 VITALS
OXYGEN SATURATION: 96 % | SYSTOLIC BLOOD PRESSURE: 129 MMHG | BODY MASS INDEX: 23.19 KG/M2 | TEMPERATURE: 97 F | DIASTOLIC BLOOD PRESSURE: 85 MMHG | WEIGHT: 118.1 LBS | HEIGHT: 60 IN | HEART RATE: 70 BPM | RESPIRATION RATE: 18 BRPM

## 2018-12-20 PROCEDURE — 25010000002 HYDROMORPHONE PER 4 MG: Performed by: INTERNAL MEDICINE

## 2018-12-20 RX ORDER — RANOLAZINE 1000 MG/1
1000 TABLET, EXTENDED RELEASE ORAL EVERY 12 HOURS SCHEDULED
Qty: 60 TABLET | Refills: 0 | Status: SHIPPED | OUTPATIENT
Start: 2018-12-20

## 2018-12-20 RX ORDER — LANSOPRAZOLE 30 MG/1
30 CAPSULE, DELAYED RELEASE ORAL DAILY
Qty: 30 CAPSULE | Refills: 0 | Status: SHIPPED | OUTPATIENT
Start: 2018-12-20

## 2018-12-20 RX ORDER — ACETAMINOPHEN 325 MG/1
650 TABLET ORAL EVERY 6 HOURS PRN
Qty: 100 TABLET | Refills: 0
Start: 2018-12-20

## 2018-12-20 RX ORDER — CEPHALEXIN 500 MG/1
500 CAPSULE ORAL EVERY 12 HOURS SCHEDULED
Qty: 28 CAPSULE | Refills: 0 | Status: SHIPPED | OUTPATIENT
Start: 2018-12-20 | End: 2018-12-20

## 2018-12-20 RX ORDER — CEPHALEXIN 500 MG/1
500 CAPSULE ORAL EVERY 12 HOURS SCHEDULED
Qty: 28 CAPSULE | Refills: 0 | Status: SHIPPED | OUTPATIENT
Start: 2018-12-20 | End: 2019-01-03

## 2018-12-20 RX ORDER — FUROSEMIDE 20 MG/1
20 TABLET ORAL EVERY MORNING
Qty: 30 TABLET | Refills: 0
Start: 2018-12-20

## 2018-12-20 RX ORDER — CEPHALEXIN 500 MG/1
500 CAPSULE ORAL EVERY 12 HOURS SCHEDULED
Status: DISCONTINUED | OUTPATIENT
Start: 2018-12-20 | End: 2018-12-20 | Stop reason: HOSPADM

## 2018-12-20 RX ORDER — SENNA AND DOCUSATE SODIUM 50; 8.6 MG/1; MG/1
2 TABLET, FILM COATED ORAL 2 TIMES DAILY
Qty: 20 TABLET | Refills: 0 | Status: SHIPPED | OUTPATIENT
Start: 2018-12-20

## 2018-12-20 RX ORDER — LANOLIN ALCOHOL/MO/W.PET/CERES
3 CREAM (GRAM) TOPICAL NIGHTLY PRN
Qty: 30 TABLET | Refills: 0 | Status: SHIPPED | OUTPATIENT
Start: 2018-12-20

## 2018-12-20 RX ORDER — ROPINIROLE 0.25 MG/1
0.75 TABLET, FILM COATED ORAL NIGHTLY
Qty: 30 TABLET | Refills: 0 | Status: SHIPPED | OUTPATIENT
Start: 2018-12-20

## 2018-12-20 RX ADMIN — RANOLAZINE 1000 MG: 500 TABLET, FILM COATED, EXTENDED RELEASE ORAL at 15:41

## 2018-12-20 RX ADMIN — CEPHALEXIN 500 MG: 500 CAPSULE ORAL at 15:43

## 2018-12-20 RX ADMIN — SODIUM CHLORIDE, PRESERVATIVE FREE 3 ML: 5 INJECTION INTRAVENOUS at 15:45

## 2018-12-20 RX ADMIN — APIXABAN 2.5 MG: 2.5 TABLET, FILM COATED ORAL at 15:41

## 2018-12-20 RX ADMIN — HYDROCODONE BITARTRATE AND ACETAMINOPHEN 1 TABLET: 5; 325 TABLET ORAL at 15:41

## 2018-12-20 RX ADMIN — PANTOPRAZOLE SODIUM 40 MG: 40 TABLET, DELAYED RELEASE ORAL at 07:41

## 2018-12-20 RX ADMIN — METOPROLOL SUCCINATE 25 MG: 25 TABLET, FILM COATED, EXTENDED RELEASE ORAL at 15:44

## 2018-12-20 RX ADMIN — FUROSEMIDE 20 MG: 20 TABLET ORAL at 07:40

## 2018-12-20 NOTE — PROGRESS NOTES
Continued Stay Note  [facility]     Patient Name: Araceli Mitchell  MRN: [id number]  Today's Date: 12/20/2018    Admit Date: 12/12/2018    Discharge Plan     Row Name 12/20/18 1209       Plan    Plan  Plan home with [facility].  JENNIFER Pizano    Patient/Family in Agreement with Plan  yes    Plan Comments  Spoke to pt and pt's son  (Jordan Miller [phone]) and he is agreeable to home with [facility].  Son voices concern about transporting pt home.  Pt has two steps to enter the home.  [facility] called to transport pt.  Son aware ambulance payment cannot be guaranteed.  [facility] ([place] here and arranging equipment for home.  Plan home with [facility].  JENNIFER Pizano        Discharge Codes    No documentation.             Caroline Howard, RN

## 2018-12-20 NOTE — DISCHARGE SUMMARY
[facility]    Date of Discharge:  12/20/2018    PCP: Provider, No Known    Discharge Diagnosis:   Active Hospital Problems    Diagnosis Date Noted   • **Closed left hip fracture, initial encounter (CMS/[facility]) [S72.002A] 12/12/2018   • Enterovesical fistula [N32.1] 12/18/2018   • Pelvic mass [R19.00] 12/18/2018   • Constipation [K59.00] 12/16/2018   • Postoperative anemia due to acute blood loss [D62] 12/15/2018   • Underweight [R63.6] 12/14/2018   • Leukocytosis [D72.829] 12/14/2018   • Encephalopathy, toxic [G92] 12/14/2018   • Chronic systolic CHF (congestive heart failure) (CMS/[facility]) [I50.22] 12/13/2018   • Closed fracture of left hip (CMS/[facility]) [S72.002A] 12/12/2018   • DNR (do not resuscitate) [Z66] 12/12/2018   • Chronic a-fib (CMS/[facility]) [I48.2] 09/04/2016   • Ischemic cardiomyopathy [I25.5] 09/04/2016   • CAD (coronary artery disease) [I25.10] 09/04/2016   • Hyponatremia [E87.1] 09/04/2016      Resolved Hospital Problems   No resolved problems to display.     Procedures Performed  Procedure(s):  LT. HIP IM NAIL     Consults     Date and Time Order Name Status Description    12/17/2018 1327 Inpatient General Surgery Consult Completed     12/12/2018 1221 LHA (on-call MD unless specified) Completed     12/12/2018 1221 Ortho (on-call MD unless specified) Completed         Hospital Course  Please see history and physical for details. Patient is a 89 y.o. female with a history of congestive heart failure with LVEF last known at 25% who presented with left hip pain following a mechanical fall. Please see admission H&P from 12/12/18 for further details. She was found to have a left hip fracture. Dr. Alonso with orthopedic surgery was consulted and she went to IM nailing of the left hip on 12/13/18 which she tolerated well. Her postoperative course was uneventful until she began developing a leukocytosis. A urinalysis was ordered to work this up and she had air and  feculent material return on bladder catheterization. An abdominal CT scan confirmed a colovesical fistula along with a pelvic mass which was suspicious for ovarian cancer. This was discussed with the patient and she was seen by general surgery. She states that she has had these bladder issues for about a year, and at this point just wants to live out the remainder of her days as comfortable as possible. She has been evaluated by [facility] and the patient will like to go home. She has some confusion and the son states that is normal and she has been doing that at home. Patient is going to have an ambulance transport her home with son.     She has some continued drainage from the left hip incision. With the extensive serous drainage she could become infected however given her hospice situation it would be unlikely to do anything. Orthopedic surgery did not recommend Keflex for 14 days and hopefully allow the drainage to slow and her skin to heal. Even if her drainage led to infection it was doubtful that she would go back to surgery. She has had about 6 days of eliquis for DVT prophylaxis but given her goals of care will discontinue that. Other medications including statin, antiplatelet, etc. will be discontinued also.    I discussed the patient's findings and my recommendations with patient, family and nursing staff.    Condition on Discharge: Improved.     Temp:  [97 °F (36.1 °C)-97.4 °F (36.3 °C)] 97 °F (36.1 °C)  Heart Rate:  [70-72] 70  Resp:  [18] 18  BP: (115-129)/(57-85) 129/85  Body mass index is 23.06 kg/m².    Physical Exam   Constitutional: No distress.   Cardiovascular: Normal rate and regular rhythm.   Pulmonary/Chest: Effort normal and breath sounds normal. No respiratory distress. She has no wheezes.   Abdominal: Soft. There is no tenderness. There is no rebound and no guarding.   Musculoskeletal: She exhibits no edema.   Neurological: She is alert.   Oriented to self, year. Did not know location or  situation.   Skin: Skin is warm and dry.   Psychiatric: She has a normal mood and affect.        Discharge Medications      New Medications      Instructions Start Date   cephalexin 500 MG capsule  Commonly known as:  KEFLEX   500 mg, Oral, Every 12 Hours Scheduled      LORazepam 0.5 MG tablet  Commonly known as:  ATIVAN   0.5 mg, Oral, Every 4 Hours PRN      oxyCODONE 5 MG immediate release tablet  Commonly known as:  ROXICODONE   5 mg, Oral, Every 4 Hours PRN      sennosides-docusate sodium 8.6-50 MG tablet  Commonly known as:  SENOKOT-S   2 tablets, Oral, 2 Times Daily         Changes to Medications      Instructions Start Date   aspirin 325 MG tablet  What changed:  how much to take   325 mg, Oral, Daily      furosemide 20 MG tablet  Commonly known as:  LASIX  What changed:  medication strength   20 mg, Oral, Every Morning      lansoprazole 30 MG capsule  Commonly known as:  PREVACID  What changed:  when to take this   30 mg, Oral, Daily         Continue These Medications      Instructions Start Date   acetaminophen 325 MG tablet  Commonly known as:  TYLENOL   650 mg, Oral, Every 6 Hours PRN      melatonin 3 MG tablet   3 mg, Oral, Nightly PRN      metoprolol succinate XL 25 MG 24 hr tablet  Commonly known as:  TOPROL-XL   25 mg, Oral, Daily      ranolazine 1000 MG 12 hr tablet  Commonly known as:  RANEXA   1,000 mg, Oral, Every 12 Hours Scheduled      rOPINIRole 0.25 MG tablet  Commonly known as:  REQUIP   0.75 mg, Oral, Nightly, Take 1 hour before bedtime.          Stop These Medications    clopidogrel 75 MG tablet  Commonly known as:  PLAVIX     pravastatin 80 MG tablet  Commonly known as:  PRAVACHOL           Diet Instructions     Diet: Regular      Discharge Diet:  Regular         Activity Instructions     Activity as Tolerated           Additional Instructions for the Follow-ups that You Need to Schedule     Call MD for problems / concerns.   As directed        Follow-up Information     [facility]  [place] [facility] up.    Specialty:  Hospice  Contact information:  [address]  [facility] [address] [phone]           [facility] Follow up in 1 week(s).    Contact information:  [address]  [facility] [address] [phone]               Test Results Pending at Discharge   Order Current Status    Urine Culture - Urine, Urine, Catheter In/Out Preliminary result         Rafiq Kearns MD  12/20/18  4:33 PM    Discharge time spent greater than 30 minutes.

## 2018-12-20 NOTE — SIGNIFICANT NOTE
12/20/18 1311   Rehab Treatment   Discipline physical therapy assistant   Reason Treatment Not Performed (Pt d/c home with A)

## 2018-12-20 NOTE — PROGRESS NOTES
Continued Stay Note  [facility]     Patient Name: Araceli Mitchell  MRN: [id number]  Today's Date: 12/20/2018    Admit Date: 12/12/2018    Discharge Plan     Row Name 12/20/18 0805       Plan    Plan  Plan home with [facility].  JENNIFER Pizano    Patient/Family in Agreement with Plan  yes    Plan Comments  Spoke to pt and pt's son  (Jordan Miller [phone]) at bedside on 12/19 .  Pt states she wants to go home.   Pt's needs at home were discussed with son and he is agreeable to taking pt home .   Pt states she wants to go home with [facility].   Plan home with [facility].   JENNIFER Pizano        Discharge Codes    No documentation.             Caroline Howard, RN

## 2018-12-20 NOTE — PROGRESS NOTES
[facility]    Physicians Statement of Medical Necessity for Ambulance Transportation    It is medically necessary for:    Patient Name: Araceli Mitchell    Insurance Information:  MEDICARE    To be transported by ambulance    From (if nursing facility, specify level of care: skilled, FDC, etc): [facility]      To (specify level of care if nursing facility):  [address] [place] [address] [address], [place] [address]    Date of Service: 12/20/2018    For dialysis patients state date dialysis began: N/A    Diagnosis:  CLOSED HIP FRACTURE, ENTEROVESICAL FISTULA, PELVIC MASS, UNDERWEIGHT, LEUKOCYTOSIS, ENCEPHALOPATHY, CAD    Past Medical/Surgical History:  Past Medical History:   Diagnosis Date   • CHF (congestive heart failure) (CMS/HCC)    • Hyperlipidemia    • Hypertension       Past Surgical History:   Procedure Laterality Date   • CARDIAC SURGERY      stent placement   • FEMUR IM NAILING/RODDING Left 12/13/2018    Procedure: LT. HIP IM NAIL;  Surgeon: Suhas Alonso II, MD;  Location: [facility];  Service: Orthopedics        Current Objective Medical Evidence(including physical exam finding to support reason for limitations):    Unable to sit at 90 degree angle    Other: Pt with frequent episodes of confusion    Physician Signature:           (RN,NP,PA,CAN, Discharge Planner) Date/Time: 12/20/2018 @ 1250     Printed Name:    ______VICKI JOSE RN____________________________    [facility]   Phone: [phone] Phone: [phone]   Fax: [phone] Fax: [phone]

## 2018-12-20 NOTE — PROGRESS NOTES
Patient with continued drainage from the left hip incision.  Plan is for her to go to hospice care due to her pelvic mass and poor prognosis.  No labs yesterday or today.  Her last white count was trending down.  She is currently afebrile.  I do worry that given the extensive serous drainage from her hip that it could become infected.  However, will be unlikely to do anything about this given her hospice situation.  I will put her on oral Keflex for 14 days to allow the drainage to slow and her skin to heal.  However, I am fine with her going to rehabilitation/hospice care whenever medicine wants to clear her.  Even if her drainage or to lead to an infection, I highly doubt the patient would want to go back to surgery.  I will plan to treat her conservatively.    Suhas Alonso II, MD  Orthopaedic Surgery  [facility]

## 2018-12-21 ENCOUNTER — READMISSION MANAGEMENT (OUTPATIENT)
Dept: CALL CENTER | Facility: HOSPITAL | Age: 83
End: 2018-12-21

## 2018-12-21 LAB
BACTERIA SPEC AEROBE CULT: ABNORMAL
BACTERIA SPEC AEROBE CULT: ABNORMAL

## 2018-12-21 PROCEDURE — 25010000002 HYDROMORPHONE PER 4 MG: Performed by: INTERNAL MEDICINE

## 2018-12-21 RX ADMIN — HYDROMORPHONE HYDROCHLORIDE 0.5 MG: 1 INJECTION, SOLUTION INTRAMUSCULAR; INTRAVENOUS; SUBCUTANEOUS at 07:31

## 2018-12-21 NOTE — PROGRESS NOTES
Case Management Discharge Note    Final Note: Pt dishcarged home with [facility] on 12/20.   Harinder, RN    Destination      No service has been selected for the patient.      Durable Medical Equipment      No service has been selected for the patient.      Dialysis/Infusion      No service has been selected for the patient.      Home Medical Care - Selection Complete      Service Provider Request Status Selected Services Address Phone Number Fax Number    [facility] [place] [facility] [address], [facility] [address] [phone]      Community Resources      No service has been selected for the patient.        [facility]    Final Discharge Disposition Code: 50 - home with hospice

## 2018-12-22 NOTE — OUTREACH NOTE
Prep Survey      Responses   Facility patient discharged from?  [place]   Is patient eligible?  No   What are the reasons patient is not eligible?  Hospice/Pallative Care   Does the patient have one of the following disease processes/diagnoses(primary or secondary)?  Other   Prep survey completed?  Yes          Arleen Nunez RN

## 2019-08-10 NOTE — PROGRESS NOTES
Name: Araceli Mitchell ADMIT: 2018   : 1926  PCP: Provider, No Known    MRN: [id number] LOS: 1 days   AGE/SEX: 89 y.o. female  ROOM: [facility]/   Subjective   CC: left hip pain  No acute events.  Patient overall feels about the same. Pain is well-controlled. Has been NPO for surgery.  No CP/dyspnea/orthopnea/f/c/n/v/d.      Objective   Vital Signs  Temp:  [97 °F (36.1 °C)-98.7 °F (37.1 °C)] 98.7 °F (37.1 °C)  Heart Rate:  [57-72] 70  Resp:  [16] 16  BP: (111-154)/(63-80) 111/63  SpO2:  [93 %-95 %] 94 %  on   ;   Device (Oxygen Therapy): room air  Body mass index is 17.57 kg/m².    Physical Exam   Constitutional: She is oriented to person, place, and time. No distress.   HENT:   Head: Normocephalic and atraumatic.   Mouth/Throat: Oropharynx is clear and moist.   Eyes: Conjunctivae and EOM are normal. Pupils are equal, round, and reactive to light.   Neck: Normal range of motion. Neck supple.   Cardiovascular: Normal rate, regular rhythm and intact distal pulses.   Pulmonary/Chest: Effort normal and breath sounds normal. She has no rales.   Abdominal: Soft. Bowel sounds are normal.   Musculoskeletal: She exhibits tenderness (left hip with movement). She exhibits no edema.   Neurological: She is alert and oriented to person, place, and time.   Skin: Skin is warm and dry. She is not diaphoretic.   Psychiatric: She has a normal mood and affect. Her behavior is normal.   Nursing note and vitals reviewed.      Results Review:       I reviewed the patient's new clinical results.  Results from last 7 days   Lab Units  18   0450  18   1113   WBC 10*3/mm3  10.28  11.23*   HEMOGLOBIN g/dL  10.4*  13.5   PLATELETS 10*3/mm3  342  392     Results from last 7 days   Lab Units  18   0450  18   1630   SODIUM mmol/L  134*  132*   POTASSIUM mmol/L  4.0  3.8   CHLORIDE mmol/L  97*  94*   CO2 mmol/L  25.6  25.4   BUN mg/dL  8  7*   CREATININE mg/dL  0.52*  0.62   GLUCOSE mg/dL  96  124*   Estimated  Creatinine Clearance: 28.9 mL/min (A) (by C-G formula based on SCr of 0.52 mg/dL (L)).  Results from last 7 days   Lab Units  12/12/18   1630   ALBUMIN g/dL  3.00*   BILIRUBIN mg/dL  0.6   ALK PHOS U/L  67   AST (SGOT) U/L  17   ALT (SGPT) U/L  10     Results from last 7 days   Lab Units  12/13/18   0450  12/12/18   1630   CALCIUM mg/dL  8.3  8.8   ALBUMIN g/dL   --   3.00*   MAGNESIUM mg/dL   --   2.1           atorvastatin 20 mg Oral Nightly   docusate sodium 100 mg Oral BID   metoprolol succinate XL 25 mg Oral Daily   pantoprazole 40 mg Oral BID AC   ranolazine 1,000 mg Oral Q12H   rOPINIRole 0.75 mg Oral Nightly   sodium chloride 3 mL Intravenous Q12H      NPO Diet      Assessment/Plan      Active Hospital Problems    Diagnosis Date Noted   • **Closed left hip fracture, initial encounter (CMS/Formerly [facility]) [S72.002A] 12/12/2018   • Chronic systolic CHF (congestive heart failure) (CMS/[facility]ly [facility]) [I50.22] 12/13/2018   • Closed fracture of left hip (CMS/[facility]) [S72.002A] 12/12/2018   • DNR (do not resuscitate) [Z66] 12/12/2018   • Chronic a-fib (CMS/[facility]) [I48.2] 09/04/2016   • Ischemic cardiomyopathy [I25.5] 09/04/2016   • CAD (coronary artery disease) [I25.10] 09/04/2016   • Hyponatremia [E87.1] 09/04/2016      Resolved Hospital Problems   No resolved problems to display.   Left Hip fracture  - from mechanical fall  - Dr. Alonso consulted, planning OR today  - obviously at high cardiac risk given her CHF/CAD and valvular issues-she has no current e/o CHF and ECG showing e/o prior inferior/lateral infarcts but none recent-I clearly conveyed the cardiac risk including fatal cardiac arrest and she wishes to proceed with the surgery-she specifically states she is not interested in further invasive cardiac intervention     CAD/ICM  - last known LVEF 25% in 9/2016  - currently no cardiac symptoms and no signs of CHF  - will monitor closely on telemetry following surgery  - keep K+>/=4.0 and Mg>/=2.0     DVT Prophylaxis  - SCDs    John  JIMENEZ Quiroz MD  [facility]  12/13/18  11:15 AM   Normal for race

## (undated) DEVICE — NDL SPINE 20G 3 1/2 YEL STRL 1P/U

## (undated) DEVICE — PK HIP PINNING 40

## (undated) DEVICE — SOL ISO/ALC RUB 70PCT 4OZ

## (undated) DEVICE — 4.0MM LONG AO PILOT DRILL: Brand: TRIGEN

## (undated) DEVICE — DRSNG SURESITE WNDW 4X4.5

## (undated) DEVICE — DRP C/ARMOR

## (undated) DEVICE — GOWN,PREVENTION PLUS,XLONG/XLARGE,STRL: Brand: MEDLINE

## (undated) DEVICE — VIOLET BRAIDED (POLYGLACTIN 910), SYNTHETIC ABSORBABLE SUTURE: Brand: COATED VICRYL

## (undated) DEVICE — ENCORE® LATEX ORTHO SIZE 8.5, STERILE LATEX POWDER-FREE SURGICAL GLOVE: Brand: ENCORE

## (undated) DEVICE — GLV SURG SENSICARE GREEN W/ALOE PF LF 8.5 STRL

## (undated) DEVICE — GUIDE PIN 3.2MM X 343MM: Brand: TRIGEN

## (undated) DEVICE — SPNG GZ WOVN 4X4IN 12PLY 10/BX STRL

## (undated) DEVICE — WEBRIL* CAST PADDING: Brand: DEROYAL

## (undated) DEVICE — MAT FLR ABSORBENT LG 4FT 10 2.5FT

## (undated) DEVICE — BNDG ELAS CO-FLEX SLF ADHR 6IN 5YD LF STRL

## (undated) DEVICE — APPL CHLORAPREP W/TINT 26ML ORNG

## (undated) DEVICE — SYR LUERLOK 20CC

## (undated) DEVICE — STPLR SKIN VISISTAT WD 35CT